# Patient Record
Sex: FEMALE | Race: OTHER | HISPANIC OR LATINO
[De-identification: names, ages, dates, MRNs, and addresses within clinical notes are randomized per-mention and may not be internally consistent; named-entity substitution may affect disease eponyms.]

---

## 2023-03-09 ENCOUNTER — TRANSCRIPTION ENCOUNTER (OUTPATIENT)
Age: 33
End: 2023-03-09

## 2023-03-10 ENCOUNTER — INPATIENT (INPATIENT)
Facility: HOSPITAL | Age: 33
LOS: 3 days | Discharge: HOME | DRG: 817 | End: 2023-03-14
Attending: OBSTETRICS & GYNECOLOGY | Admitting: OBSTETRICS & GYNECOLOGY
Payer: MEDICAID

## 2023-03-10 VITALS
WEIGHT: 115.08 LBS | OXYGEN SATURATION: 96 % | TEMPERATURE: 98 F | DIASTOLIC BLOOD PRESSURE: 70 MMHG | SYSTOLIC BLOOD PRESSURE: 115 MMHG | RESPIRATION RATE: 18 BRPM | HEART RATE: 92 BPM

## 2023-03-10 DIAGNOSIS — O00.109 UNSPECIFIED TUBAL PREGNANCY WITHOUT INTRAUTERINE PREGNANCY: ICD-10-CM

## 2023-03-10 DIAGNOSIS — Z98.89 OTHER SPECIFIED POSTPROCEDURAL STATES: Chronic | ICD-10-CM

## 2023-03-10 DIAGNOSIS — O00.90 UNSPECIFIED ECTOPIC PREGNANCY WITHOUT INTRAUTERINE PREGNANCY: ICD-10-CM

## 2023-03-10 LAB
ALBUMIN SERPL ELPH-MCNC: 3.4 G/DL — LOW (ref 3.5–5)
ALP SERPL-CCNC: 99 U/L — SIGNIFICANT CHANGE UP (ref 40–120)
ALT FLD-CCNC: 21 U/L DA — SIGNIFICANT CHANGE UP (ref 10–60)
ANION GAP SERPL CALC-SCNC: 7 MMOL/L — SIGNIFICANT CHANGE UP (ref 5–17)
APPEARANCE UR: CLEAR — SIGNIFICANT CHANGE UP
APTT BLD: 24.7 SEC — LOW (ref 27.5–35.5)
AST SERPL-CCNC: 19 U/L — SIGNIFICANT CHANGE UP (ref 10–40)
BACTERIA # UR AUTO: ABNORMAL /HPF
BASOPHILS # BLD AUTO: 0.04 K/UL — SIGNIFICANT CHANGE UP (ref 0–0.2)
BASOPHILS NFR BLD AUTO: 0.3 % — SIGNIFICANT CHANGE UP (ref 0–2)
BILIRUB SERPL-MCNC: 0.2 MG/DL — SIGNIFICANT CHANGE UP (ref 0.2–1.2)
BILIRUB UR-MCNC: NEGATIVE — SIGNIFICANT CHANGE UP
BLD GP AB SCN SERPL QL: SIGNIFICANT CHANGE UP
BUN SERPL-MCNC: 13 MG/DL — SIGNIFICANT CHANGE UP (ref 7–18)
CALCIUM SERPL-MCNC: 8.6 MG/DL — SIGNIFICANT CHANGE UP (ref 8.4–10.5)
CHLORIDE SERPL-SCNC: 105 MMOL/L — SIGNIFICANT CHANGE UP (ref 96–108)
CO2 SERPL-SCNC: 23 MMOL/L — SIGNIFICANT CHANGE UP (ref 22–31)
COLOR SPEC: YELLOW — SIGNIFICANT CHANGE UP
CREAT SERPL-MCNC: 0.93 MG/DL — SIGNIFICANT CHANGE UP (ref 0.5–1.3)
DIFF PNL FLD: ABNORMAL
EGFR: 85 ML/MIN/1.73M2 — SIGNIFICANT CHANGE UP
EOSINOPHIL # BLD AUTO: 0.42 K/UL — SIGNIFICANT CHANGE UP (ref 0–0.5)
EOSINOPHIL NFR BLD AUTO: 3.1 % — SIGNIFICANT CHANGE UP (ref 0–6)
EPI CELLS # UR: SIGNIFICANT CHANGE UP /HPF
GLUCOSE SERPL-MCNC: 108 MG/DL — HIGH (ref 70–99)
GLUCOSE UR QL: NEGATIVE — SIGNIFICANT CHANGE UP
HCG SERPL-ACNC: 765 MIU/ML — HIGH
HCT VFR BLD CALC: 33.5 % — LOW (ref 34.5–45)
HGB BLD-MCNC: 10.3 G/DL — LOW (ref 11.5–15.5)
IMM GRANULOCYTES NFR BLD AUTO: 0.3 % — SIGNIFICANT CHANGE UP (ref 0–0.9)
INR BLD: 0.98 RATIO — SIGNIFICANT CHANGE UP (ref 0.88–1.16)
KETONES UR-MCNC: NEGATIVE — SIGNIFICANT CHANGE UP
LEUKOCYTE ESTERASE UR-ACNC: NEGATIVE — SIGNIFICANT CHANGE UP
LIDOCAIN IGE QN: 109 U/L — SIGNIFICANT CHANGE UP (ref 73–393)
LYMPHOCYTES # BLD AUTO: 2.83 K/UL — SIGNIFICANT CHANGE UP (ref 1–3.3)
LYMPHOCYTES # BLD AUTO: 21 % — SIGNIFICANT CHANGE UP (ref 13–44)
MCHC RBC-ENTMCNC: 23.1 PG — LOW (ref 27–34)
MCHC RBC-ENTMCNC: 30.7 GM/DL — LOW (ref 32–36)
MCV RBC AUTO: 75.3 FL — LOW (ref 80–100)
MONOCYTES # BLD AUTO: 0.67 K/UL — SIGNIFICANT CHANGE UP (ref 0–0.9)
MONOCYTES NFR BLD AUTO: 5 % — SIGNIFICANT CHANGE UP (ref 2–14)
NEUTROPHILS # BLD AUTO: 9.46 K/UL — HIGH (ref 1.8–7.4)
NEUTROPHILS NFR BLD AUTO: 70.3 % — SIGNIFICANT CHANGE UP (ref 43–77)
NITRITE UR-MCNC: NEGATIVE — SIGNIFICANT CHANGE UP
NRBC # BLD: 0 /100 WBCS — SIGNIFICANT CHANGE UP (ref 0–0)
PH UR: 5 — SIGNIFICANT CHANGE UP (ref 5–8)
PLATELET # BLD AUTO: 359 K/UL — SIGNIFICANT CHANGE UP (ref 150–400)
POTASSIUM SERPL-MCNC: 3.4 MMOL/L — LOW (ref 3.5–5.3)
POTASSIUM SERPL-SCNC: 3.4 MMOL/L — LOW (ref 3.5–5.3)
PROT SERPL-MCNC: 8.2 G/DL — SIGNIFICANT CHANGE UP (ref 6–8.3)
PROT UR-MCNC: NEGATIVE — SIGNIFICANT CHANGE UP
PROTHROM AB SERPL-ACNC: 11.6 SEC — SIGNIFICANT CHANGE UP (ref 10.5–13.4)
RBC # BLD: 4.45 M/UL — SIGNIFICANT CHANGE UP (ref 3.8–5.2)
RBC # FLD: 17.4 % — HIGH (ref 10.3–14.5)
RBC CASTS # UR COMP ASSIST: ABNORMAL /HPF (ref 0–2)
SARS-COV-2 RNA SPEC QL NAA+PROBE: SIGNIFICANT CHANGE UP
SODIUM SERPL-SCNC: 135 MMOL/L — SIGNIFICANT CHANGE UP (ref 135–145)
SP GR SPEC: 1.02 — SIGNIFICANT CHANGE UP (ref 1.01–1.02)
UROBILINOGEN FLD QL: NEGATIVE — SIGNIFICANT CHANGE UP
WBC # BLD: 13.46 K/UL — HIGH (ref 3.8–10.5)
WBC # FLD AUTO: 13.46 K/UL — HIGH (ref 3.8–10.5)
WBC UR QL: ABNORMAL /HPF (ref 0–5)

## 2023-03-10 PROCEDURE — 76830 TRANSVAGINAL US NON-OB: CPT | Mod: 26

## 2023-03-10 PROCEDURE — 76856 US EXAM PELVIC COMPLETE: CPT | Mod: 26

## 2023-03-10 PROCEDURE — 99285 EMERGENCY DEPT VISIT HI MDM: CPT

## 2023-03-10 PROCEDURE — 88300 SURGICAL PATH GROSS: CPT | Mod: 26,59

## 2023-03-10 PROCEDURE — 88305 TISSUE EXAM BY PATHOLOGIST: CPT | Mod: 26

## 2023-03-10 RX ORDER — SODIUM CHLORIDE 9 MG/ML
1000 INJECTION, SOLUTION INTRAVENOUS
Refills: 0 | Status: DISCONTINUED | OUTPATIENT
Start: 2023-03-10 | End: 2023-03-11

## 2023-03-10 RX ORDER — ONDANSETRON 8 MG/1
4 TABLET, FILM COATED ORAL ONCE
Refills: 0 | Status: COMPLETED | OUTPATIENT
Start: 2023-03-10 | End: 2023-03-10

## 2023-03-10 RX ORDER — MORPHINE SULFATE 50 MG/1
4 CAPSULE, EXTENDED RELEASE ORAL ONCE
Refills: 0 | Status: DISCONTINUED | OUTPATIENT
Start: 2023-03-10 | End: 2023-03-10

## 2023-03-10 RX ADMIN — SODIUM CHLORIDE 125 MILLILITER(S): 9 INJECTION, SOLUTION INTRAVENOUS at 22:44

## 2023-03-10 RX ADMIN — MORPHINE SULFATE 4 MILLIGRAM(S): 50 CAPSULE, EXTENDED RELEASE ORAL at 19:55

## 2023-03-10 RX ADMIN — ONDANSETRON 4 MILLIGRAM(S): 8 TABLET, FILM COATED ORAL at 19:56

## 2023-03-10 RX ADMIN — MORPHINE SULFATE 4 MILLIGRAM(S): 50 CAPSULE, EXTENDED RELEASE ORAL at 20:25

## 2023-03-10 NOTE — PRE-OP CHECKLIST - MUPIRONCIN COMMENTS
Patient is A&Ox4 Maori speaking admitted to OBGYN for ectopic pregnancy surgical procedure. Patient reports last drink of 4oz water at 8pm & last meal at 3pm (soup). Patient has copper IUD in place & denies any significant PMH or allergies. IV fluids running as ordered IV intact, no redness or swelling noted. Patient provided chlorhex-wipes & family has property. Covid - & labs sent as ordered. Endorsed to Kaley VÁSQUEZ in OR.

## 2023-03-10 NOTE — H&P ADULT - PROBLEM SELECTOR PLAN 1
5/30/2022  ILeighann DO, saw and evaluated the patient  I have discussed the patient with the resident/non-physician practitioner and agree with the resident's/non-physician practitioner's findings, Plan of Care, and MDM as documented in the resident's/non-physician practitioner's note, except where noted  All available labs and Radiology studies were reviewed  I was present for key portions of any procedure(s) performed by the resident/non-physician practitioner and I was immediately available to provide assistance  At this point I agree with the current assessment done in the Emergency Department  I have conducted an independent evaluation of this patient a history and physical is as follows:    42 yo female recently diagnosed with nonviable pregnancy and miscarriage presents for worsening bleeding  Has not followed up with the OBGYN since her discharge  Worsening cramping and bleeding today  Feels lightheaded feels tachycardic  She has no other complaints no chest pain  On exam the patient is tachycardic to 120 she has normal blood pressure plan is check CBC for anemia  She is not actively hemorrhaging right now vaginally  Recheck a beta hCG    She does have follow-up with OBGYN in two days    ED Course         Critical Care Time  Procedures a/p: 31 yo F, left ruptured ectopic pregnancy  - patient is consented at bedside for diagnostic lap, possible open salpingectomy  - pre operative labwork sent  - cont close monitoring    Case d/w Dr Cisneros

## 2023-03-10 NOTE — ED PROVIDER NOTE - PHYSICAL EXAMINATION
General: pt lying in stretcher, appears stated age and is not in distress  HEENT: AT/NC, pink conjunctiva, anicteric sclerae, EOMI, mmm   Neck: supple, full ROM, trachea midline, no JVD, no cervical LAD, no midline ttp or stepoffs  Lungs: symmetric excursion, b/l clear vesicular breath sounds with no wheezes, crackles, or rhonchi  Heart: rrr, S1, S2 normal; no S3 or S4; no murmurs or rubs  Abd: normal bowel sounds; soft, + tender across the entire low abd; no rebound, no guarding, spleen non-palpable; no hepatomegaly, no masses  Back: no midline spinal tenderness or stepoffs, no costovertebral angle tenderness  Extremities: no clubbing, cyanosis, or edema; no palpable deformities or fractures  Skin: good turgor; no rashes, petechiae, ecchymoses, or jaundice  Pulses: radial, posterior tibialis (PT), dorsalis pedis (DP) all 2+ & symmetric  Neuro: awake, alert, responsive; oriented to person, place and time; cranial nerves grossly intact, EOMI, intact jaw movement, intact facial sensation, no facial asymmetry, hearing intact; no nystagmus, tongue midline; Motor: Normal tone in upper and lower extremities bilaterally; Sensory: intact; normal steady gait;

## 2023-03-10 NOTE — ED ADULT NURSE NOTE - ED STAT RN HANDOFF DETAILS
Patient is A&Ox4 Greek speaking admitted to OBGYN for ectopic pregnancy surgical procedure. Patient reports last drink of 4oz water at 8pm & last meal at 3pm (soup). Patient has copper IUD in place & denies any significant PMH or allergies. IV fluids running as ordered IV intact, no redness or swelling noted. Patient provided chlorhex-wipes & family has property. Covid - & labs sent as ordered. Endorsed to Kaley VÁSQUEZ in OR.

## 2023-03-10 NOTE — ED PROVIDER NOTE - OBJECTIVE STATEMENT
33 yo f  LMP 23, referred to the ED for possible ectopic pregnancy. Pt reports low abd pain x 3 days progressively worsening and she was concerned about appendicitis so she went to [facility]. There she was found to have a positive HCG in the setting of IUD in place. Pt was immediately referred to the ED for further management. She reports pain across the entire low abd, nausea. Denies any f/c/ns, vaginal bleeding, diarrhea or other complaints.

## 2023-03-10 NOTE — H&P ADULT - ASSESSMENT
a/p: 33 yo F, left ruptured ectopic pregnancy  - patient is consented at bedside for diagnostic lap, possible open salpingectomy  - pre operative labwork sent  - cont close monitoring    Case d/w Dr Cisneros

## 2023-03-10 NOTE — ED ADULT NURSE NOTE - OBJECTIVE STATEMENT
[id number] [facility] Wound Ostomy Continence Nurse  Follow-up Progress Note       [facility]  MEDICAL RECORD NUMBER:  [id number]  AGE:  68 y.o.    GENDER:  female  :  1942  TODAY'S DATE:  2022    Subjective:   F/U for unstageable wound to sacrum    Objective:    BP (!) 155/85   Pulse 81   Temp 98.1 °F (36.7 °C) (Oral)   Resp 17   Ht 5' 2\" (1.575 m)   Wt 168 lb 8 oz (76.4 kg)   SpO2 94%   BMI 30.82 kg/m²   Sami Risk Score: Sami Scale Score: 11  Assessment:   Measurements:  Wound 22 Heel Left (Active)   Wound Image   22 1322   Wound Etiology Deep tissue/Injury 22 0920   Dressing Status Other (Comment) 22 0916   Wound Cleansed Other (Comment) 22 0916   Dressing/Treatment Barrier film 22 1322   Wound Length (cm) 0.5 cm 22 1322   Wound Width (cm) 0.5 cm 22 1322   Wound Depth (cm) 0 cm 22 1126   Wound Surface Area (cm^2) 0.25 cm^2 22 1322   Change in Wound Size % (l*w) 0 22 1322   Wound Volume (cm^3) 0 cm^3 22 1126   Wound Assessment Purple/maroon 22 0920   Drainage Amount None 22 0920   Odor None 22 0920   Nallely-wound Assessment Intact 22 0920   Margins Attached edges 22 0920   Number of days: 17       Wound 22 Heel Right (Active)   Wound Image   22 1322   Wound Etiology Deep tissue/Injury 22 0920   Dressing Status Other (Comment) 22 0916   Wound Cleansed Other (Comment) 22 0916   Dressing/Treatment Barrier film 22 0916   Wound Length (cm) 1.5 cm 22 1322   Wound Width (cm) 1.5 cm 22 1322   Wound Depth (cm) 0 cm 22 1126   Wound Surface Area (cm^2) 2.25 cm^2 22 1322   Change in Wound Size % (l*w) -125 22 1322   Wound Volume (cm^3) 0 cm^3 22 1126   Wound Assessment Purple/maroon 22 0920   Drainage Amount None 22 0920   Odor None 22 0920   Nallely-wound Assessment Intact 22 0920 Margins Attached edges 08/16/22 0920   Number of days: 17       Wound 07/30/22 Sacrum Mid (Active)   Wound Image   08/16/22 1329   Wound Etiology Pressure Unstageable 08/16/22 1329   Dressing Status New dressing applied 08/16/22 1329   Wound Cleansed Other (Comment) 08/04/22 0916   Dressing/Treatment Silicone border 85/92/06 1329   Wound Length (cm) 7 cm 08/16/22 1329   Wound Width (cm) 9 cm 08/16/22 1329   Wound Depth (cm) 3 cm 08/16/22 1329   Wound Surface Area (cm^2) 63 cm^2 08/16/22 1329   Change in Wound Size % (l*w) -20 08/16/22 1329   Wound Volume (cm^3) 189 cm^3 08/16/22 1329   Wound Assessment Slough 08/16/22 1329   Drainage Amount Small 08/16/22 1329   Drainage Description Serosanguinous;Purulent 08/16/22 1329   Odor Mild 08/16/22 1329   Nallely-wound Assessment Denuded;Non-blanchable erythema;Fragile 08/16/22 1329   Margins Unattached edges 08/04/22 0916   Wound Thickness Description not for Pressure Injury Full thickness 08/16/22 1329   Number of days: 17           Pt awake and alert. Pt had small BM when turning. Pt wound now open with palpable bone. Attempted santyl, however may recommend debridement due to 3cm depth under slough  Periwound skin red and denuded. Abd folds red and denuded. Interdry placed. Plan:   Plan of Care:   Sacral wound: general surgery consult; MD notified  Continue wet to dry until pt can be evaluated. Specialty Bed Required : Yes   [x] Low Air Loss   [] Pressure Redistribution  [] Fluid Immersion  [] Bariatric  [] Total Pressure Relief  [] Other:     Current Diet: ADULT DIET;  Regular  ADULT ORAL NUTRITION SUPPLEMENT; Lunch, Dinner, Breakfast; Standard High Calorie/High Protein Oral Supplement  Dietician consult:  Yes    Discharge Plan:  Placement for patient upon discharge: skilled nursing   Patient appropriate for Outpatient [address]: No    Referrals:  []   [] [facility]  [] Supplies  [] Other    Patient/Caregiver Teaching:  Level of patient/caregiver understanding able to:   [] Indicates understanding       [] Needs reinforcement  [] Unsuccessful      [x] Verbal Understanding  [] Demonstrated understanding       [] No evidence of learning  [] Refused teaching         [] N/A       Electronically signed by Anoop Pereira RN, CWOCN on 8/16/2022 at 1:35 PM Patient c/o worsening RLQ pain x3 days. Patient seen at urgent care today & sent for further evaluation to r/o ectopic pregnancy as she  tested for positive pregnancy with copper IUD in place & vaginal bleeding similar to menstrual cycle. Patient reports LMP 1/28/2023.

## 2023-03-10 NOTE — H&P ADULT - HISTORY OF PRESENT ILLNESS
33 yo F presenting to ED with sharp 10 out of 10 abdominal pain beginning Tuesday with nausea beginning today. Patient's LMP 2023. Hcg positive, 7 wks.   Patient has IUD in situ. Patient denies vaginal bleeding, denies vomiting, diarrhea, SOB or chest pain.    ob/gynhx: , primary c/s in 2016, for LGA, boy. No history of STD or abnl pap smears. last seen gynecologist 2 years prior for IUD placement. Normal menses.       33 yo F presenting [facility] ED [facility] for recurring sharp 10 out of 10 abdominal pain beginning Tuesday with accompanying nausea beginning today. Patient's LMP 2023. Hcg positive, US findings concerning for left ectopic adnexal pregnancy.Patient has IUD in situ. Patient denies vaginal bleeding, denies vomiting, diarrhea, SOB or chest pain. Patient denies fevers or chills. Reports one sexual partner.    ob/gynhx: , primary c/s in 2016, for LGA, boy. Denies history of STDs or abnl pap smears. last seen gynecologist  2 years prior for IUD placement. Normal menses.  shx: c/s 2016, uncomplicated  medhx: denies

## 2023-03-10 NOTE — ED PROVIDER NOTE - CLINICAL SUMMARY MEDICAL DECISION MAKING FREE TEXT BOX
Pt w/ aforementioned presentation concerning for but not limited to ectopic pregnancy, other acute abd  Will get labs, imaging, treat symptoms, monitor and reassess.

## 2023-03-10 NOTE — H&P ADULT - NSHPPHYSICALEXAM_GEN_ALL_CORE
T(C): 36.7 (10 Mar 2023 18:07), Max: 36.7 (10 Mar 2023 18:07)  T(F): 98.1 (10 Mar 2023 18:07), Max: 98.1 (10 Mar 2023 18:07)  HR: 92 (10 Mar 2023 18:07) (92 - 92)  BP: 115/70 (10 Mar 2023 18:07) (115/70 - 115/70)  RR: 18 (10 Mar 2023 18:07) (18 - 18)  SpO2: 96% (10 Mar 2023 18:07) (96% - 96%)    gen: aaox3, NAD  abd: soft, +tenderness on palpation, +guarding, no rebound  pelvis: no vaginal bleeding  ext: nonedematous, nontender T(C): 36.7 (10 Mar 2023 18:07), Max: 36.7 (10 Mar 2023 18:07)  T(F): 98.1 (10 Mar 2023 18:07), Max: 98.1 (10 Mar 2023 18:07)  HR: 92 (10 Mar 2023 18:07) (92 - 92)  BP: 115/70 (10 Mar 2023 18:07) (115/70 - 115/70)  RR: 18 (10 Mar 2023 18:07) (18 - 18)  SpO2: 96% (10 Mar 2023 18:07) (96% - 96%)    gen: aaox3, NAD  abd: soft, +tenderness on palpation, +guarding, no rebound  pelvis: no active vaginal bleeding  ext: nonedematous, nontender b/l

## 2023-03-10 NOTE — H&P ADULT - NS ATTEND AMEND GEN_ALL_CORE FT
31 yo  at  approximately 7 weeks gestation who presents to ER with signs and symptoms suggesting a ruptured ectopic pregnancy. Findings were supported with sonographic evidence of ruptured L tubal pregnancy .Pt was consented for IUD removal ,D&C and laparoscopic exploration (and possible laparotomy)

## 2023-03-10 NOTE — H&P ADULT - NSHPLABSRESULTS_GEN_ALL_CORE
HCG Quantitative, Serum: 765                          10.3   13.46 )-----------( 359      ( 10 Mar 2023 20:27 )             33.5       < from: US Transvaginal (03.10.23 @ 20:54) >    CC: [id number] EXAM:  US TRANSVAGINAL   ORDERED BY: ROJAS FARMER     ACC: [id number] EXAM:  US PELVIC COMPLETE   ORDERED BY: ROJAS FARMER     PROCEDURE DATE:  03/10/2023          INTERPRETATION:  CLINICAL INDICATION: Lower abdominal pain, nausea. 6   weeks 1 day by LMP 1/26/2023. Beta-hCG 765.    TECHNIQUE: Transabdominal and transvaginal ultrasound of the pelvis was   performed. Grayscale, color Doppler and spectral Doppler were utilized.    COMPARISON: CT and US 10/12/2016..    FINDINGS:    Uterus: 8.4 x 4.8 x 6.2 cm. Unremarkable.  Endometrium: 1.0 cm. IUD in place. No intrauterine gestation identified.  Right ovary: 2.5 x 0.9 x 2.0 cm. Small follicles. Flow present.  Left ovary: 2.4 x 1.9 x 2.5 cm. A 1.3 x 1.6 cm corpus luteum. Flow   present. A 2.9 x 1.9 x 2.5 cm hypoechoic structure with somewhat tubular   configuration and peripheral vascularity at the left adnexa.  Additional: Trace free fluid with minimal debris..    IMPRESSION:    IUD in place. Findings concerning for left ectopic adnexal pregnancy.   Trace free fluid with minimal debris. Recommend clinical correlation to   assess early rupture.    Discussed with Dr. Farmer.    --- End of Report ---            CHELSEA SHORT MD; Attending Radiologist  This document has been electronically signed. Mar 10 2023  9:06PM    < end of copied text >

## 2023-03-11 ENCOUNTER — TRANSCRIPTION ENCOUNTER (OUTPATIENT)
Age: 33
End: 2023-03-11

## 2023-03-11 LAB
ANION GAP SERPL CALC-SCNC: 8 MMOL/L — SIGNIFICANT CHANGE UP (ref 5–17)
BASOPHILS # BLD AUTO: 0.02 K/UL — SIGNIFICANT CHANGE UP (ref 0–0.2)
BASOPHILS NFR BLD AUTO: 0.1 % — SIGNIFICANT CHANGE UP (ref 0–2)
BUN SERPL-MCNC: 10 MG/DL — SIGNIFICANT CHANGE UP (ref 7–18)
CALCIUM SERPL-MCNC: 7.5 MG/DL — LOW (ref 8.4–10.5)
CHLORIDE SERPL-SCNC: 109 MMOL/L — HIGH (ref 96–108)
CO2 SERPL-SCNC: 20 MMOL/L — LOW (ref 22–31)
CREAT SERPL-MCNC: 0.6 MG/DL — SIGNIFICANT CHANGE UP (ref 0.5–1.3)
EGFR: 124 ML/MIN/1.73M2 — SIGNIFICANT CHANGE UP
EOSINOPHIL # BLD AUTO: 0 K/UL — SIGNIFICANT CHANGE UP (ref 0–0.5)
EOSINOPHIL NFR BLD AUTO: 0 % — SIGNIFICANT CHANGE UP (ref 0–6)
GLUCOSE SERPL-MCNC: 139 MG/DL — HIGH (ref 70–99)
HCT VFR BLD CALC: 28.1 % — LOW (ref 34.5–45)
HGB BLD-MCNC: 8.8 G/DL — LOW (ref 11.5–15.5)
IMM GRANULOCYTES NFR BLD AUTO: 0.7 % — SIGNIFICANT CHANGE UP (ref 0–0.9)
LYMPHOCYTES # BLD AUTO: 1.03 K/UL — SIGNIFICANT CHANGE UP (ref 1–3.3)
LYMPHOCYTES # BLD AUTO: 6.5 % — LOW (ref 13–44)
MCHC RBC-ENTMCNC: 23.6 PG — LOW (ref 27–34)
MCHC RBC-ENTMCNC: 31.3 GM/DL — LOW (ref 32–36)
MCV RBC AUTO: 75.3 FL — LOW (ref 80–100)
MONOCYTES # BLD AUTO: 0.28 K/UL — SIGNIFICANT CHANGE UP (ref 0–0.9)
MONOCYTES NFR BLD AUTO: 1.8 % — LOW (ref 2–14)
NEUTROPHILS # BLD AUTO: 14.4 K/UL — HIGH (ref 1.8–7.4)
NEUTROPHILS NFR BLD AUTO: 90.9 % — HIGH (ref 43–77)
NRBC # BLD: 0 /100 WBCS — SIGNIFICANT CHANGE UP (ref 0–0)
PLATELET # BLD AUTO: 272 K/UL — SIGNIFICANT CHANGE UP (ref 150–400)
POTASSIUM SERPL-MCNC: 3.9 MMOL/L — SIGNIFICANT CHANGE UP (ref 3.5–5.3)
POTASSIUM SERPL-SCNC: 3.9 MMOL/L — SIGNIFICANT CHANGE UP (ref 3.5–5.3)
RBC # BLD: 3.73 M/UL — LOW (ref 3.8–5.2)
RBC # FLD: 17.3 % — HIGH (ref 10.3–14.5)
SODIUM SERPL-SCNC: 137 MMOL/L — SIGNIFICANT CHANGE UP (ref 135–145)
WBC # BLD: 15.84 K/UL — HIGH (ref 3.8–10.5)
WBC # FLD AUTO: 15.84 K/UL — HIGH (ref 3.8–10.5)

## 2023-03-11 RX ORDER — PANTOPRAZOLE SODIUM 20 MG/1
40 TABLET, DELAYED RELEASE ORAL
Refills: 0 | Status: DISCONTINUED | OUTPATIENT
Start: 2023-03-11 | End: 2023-03-12

## 2023-03-11 RX ORDER — KETOROLAC TROMETHAMINE 30 MG/ML
30 SYRINGE (ML) INJECTION EVERY 6 HOURS
Refills: 0 | Status: DISCONTINUED | OUTPATIENT
Start: 2023-03-11 | End: 2023-03-11

## 2023-03-11 RX ORDER — ONDANSETRON 8 MG/1
4 TABLET, FILM COATED ORAL EVERY 4 HOURS
Refills: 0 | Status: DISCONTINUED | OUTPATIENT
Start: 2023-03-11 | End: 2023-03-14

## 2023-03-11 RX ORDER — METOCLOPRAMIDE HCL 10 MG
10 TABLET ORAL EVERY 6 HOURS
Refills: 0 | Status: DISCONTINUED | OUTPATIENT
Start: 2023-03-11 | End: 2023-03-12

## 2023-03-11 RX ORDER — ACETAMINOPHEN 500 MG
975 TABLET ORAL EVERY 6 HOURS
Refills: 0 | Status: DISCONTINUED | OUTPATIENT
Start: 2023-03-11 | End: 2023-03-12

## 2023-03-11 RX ORDER — MAGNESIUM HYDROXIDE 400 MG/1
30 TABLET, CHEWABLE ORAL
Refills: 0 | Status: DISCONTINUED | OUTPATIENT
Start: 2023-03-11 | End: 2023-03-12

## 2023-03-11 RX ORDER — SIMETHICONE 80 MG/1
80 TABLET, CHEWABLE ORAL EVERY 6 HOURS
Refills: 0 | Status: DISCONTINUED | OUTPATIENT
Start: 2023-03-11 | End: 2023-03-12

## 2023-03-11 RX ORDER — OXYCODONE AND ACETAMINOPHEN 5; 325 MG/1; MG/1
2 TABLET ORAL ONCE
Refills: 0 | Status: DISCONTINUED | OUTPATIENT
Start: 2023-03-11 | End: 2023-03-12

## 2023-03-11 RX ORDER — MORPHINE SULFATE 50 MG/1
2 CAPSULE, EXTENDED RELEASE ORAL EVERY 4 HOURS
Refills: 0 | Status: DISCONTINUED | OUTPATIENT
Start: 2023-03-11 | End: 2023-03-11

## 2023-03-11 RX ORDER — IBUPROFEN 200 MG
600 TABLET ORAL EVERY 6 HOURS
Refills: 0 | Status: DISCONTINUED | OUTPATIENT
Start: 2023-03-11 | End: 2023-03-12

## 2023-03-11 RX ORDER — HYDROMORPHONE HYDROCHLORIDE 2 MG/ML
0.5 INJECTION INTRAMUSCULAR; INTRAVENOUS; SUBCUTANEOUS
Refills: 0 | Status: DISCONTINUED | OUTPATIENT
Start: 2023-03-11 | End: 2023-03-11

## 2023-03-11 RX ORDER — SODIUM CHLORIDE 9 MG/ML
1000 INJECTION, SOLUTION INTRAVENOUS
Refills: 0 | Status: DISCONTINUED | OUTPATIENT
Start: 2023-03-11 | End: 2023-03-11

## 2023-03-11 RX ORDER — BENZOCAINE AND MENTHOL 5; 1 G/100ML; G/100ML
1 LIQUID ORAL EVERY 4 HOURS
Refills: 0 | Status: DISCONTINUED | OUTPATIENT
Start: 2023-03-11 | End: 2023-03-12

## 2023-03-11 RX ORDER — SODIUM CHLORIDE 9 MG/ML
1000 INJECTION, SOLUTION INTRAVENOUS
Refills: 0 | Status: DISCONTINUED | OUTPATIENT
Start: 2023-03-11 | End: 2023-03-12

## 2023-03-11 RX ORDER — ONDANSETRON 8 MG/1
4 TABLET, FILM COATED ORAL ONCE
Refills: 0 | Status: DISCONTINUED | OUTPATIENT
Start: 2023-03-11 | End: 2023-03-11

## 2023-03-11 RX ADMIN — HYDROMORPHONE HYDROCHLORIDE 0.5 MILLIGRAM(S): 2 INJECTION INTRAMUSCULAR; INTRAVENOUS; SUBCUTANEOUS at 01:48

## 2023-03-11 RX ADMIN — SIMETHICONE 80 MILLIGRAM(S): 80 TABLET, CHEWABLE ORAL at 17:24

## 2023-03-11 RX ADMIN — SODIUM CHLORIDE 125 MILLILITER(S): 9 INJECTION, SOLUTION INTRAVENOUS at 17:49

## 2023-03-11 RX ADMIN — MORPHINE SULFATE 2 MILLIGRAM(S): 50 CAPSULE, EXTENDED RELEASE ORAL at 19:42

## 2023-03-11 RX ADMIN — BENZOCAINE AND MENTHOL 1 LOZENGE: 5; 1 LIQUID ORAL at 17:24

## 2023-03-11 RX ADMIN — Medication 975 MILLIGRAM(S): at 19:45

## 2023-03-11 RX ADMIN — Medication 600 MILLIGRAM(S): at 23:16

## 2023-03-11 RX ADMIN — MAGNESIUM HYDROXIDE 30 MILLILITER(S): 400 TABLET, CHEWABLE ORAL at 18:10

## 2023-03-11 RX ADMIN — Medication 30 MILLIGRAM(S): at 06:15

## 2023-03-11 RX ADMIN — PANTOPRAZOLE SODIUM 40 MILLIGRAM(S): 20 TABLET, DELAYED RELEASE ORAL at 05:47

## 2023-03-11 RX ADMIN — Medication 975 MILLIGRAM(S): at 13:06

## 2023-03-11 RX ADMIN — SIMETHICONE 80 MILLIGRAM(S): 80 TABLET, CHEWABLE ORAL at 23:16

## 2023-03-11 RX ADMIN — SODIUM CHLORIDE 125 MILLILITER(S): 9 INJECTION, SOLUTION INTRAVENOUS at 06:59

## 2023-03-11 RX ADMIN — MORPHINE SULFATE 2 MILLIGRAM(S): 50 CAPSULE, EXTENDED RELEASE ORAL at 20:00

## 2023-03-11 RX ADMIN — SIMETHICONE 80 MILLIGRAM(S): 80 TABLET, CHEWABLE ORAL at 12:06

## 2023-03-11 RX ADMIN — Medication 30 MILLIGRAM(S): at 12:02

## 2023-03-11 RX ADMIN — HYDROMORPHONE HYDROCHLORIDE 0.5 MILLIGRAM(S): 2 INJECTION INTRAMUSCULAR; INTRAVENOUS; SUBCUTANEOUS at 01:30

## 2023-03-11 RX ADMIN — Medication 30 MILLIGRAM(S): at 19:45

## 2023-03-11 RX ADMIN — HYDROMORPHONE HYDROCHLORIDE 0.5 MILLIGRAM(S): 2 INJECTION INTRAMUSCULAR; INTRAVENOUS; SUBCUTANEOUS at 01:15

## 2023-03-11 RX ADMIN — Medication 30 MILLIGRAM(S): at 12:17

## 2023-03-11 RX ADMIN — Medication 30 MILLIGRAM(S): at 05:47

## 2023-03-11 RX ADMIN — Medication 975 MILLIGRAM(S): at 12:06

## 2023-03-11 RX ADMIN — HYDROMORPHONE HYDROCHLORIDE 0.5 MILLIGRAM(S): 2 INJECTION INTRAMUSCULAR; INTRAVENOUS; SUBCUTANEOUS at 01:32

## 2023-03-11 RX ADMIN — Medication 975 MILLIGRAM(S): at 17:24

## 2023-03-11 RX ADMIN — Medication 30 MILLIGRAM(S): at 18:10

## 2023-03-11 NOTE — CHART NOTE - NSCHARTNOTEFT_GEN_A_CORE
Dr Valentine accompanied to bedside for evaluation of abdominal pain. Pt c/o intractable pain.    BP 89/47 HR 82 Temp 37 deg C  Abdominal PE: +bs, soft, tenderness that is disproportionate to palpation of the incision site, nondistended, dressing removed steri strips in place  Arnold bag was repositioned and immediately began filling with clear yellow urine, 650 cc now in bag    A/P 29 y/o POD #0 s/p mini laparotomy, left salpingectomy and IUD removal for left ectopic pregnancy. Intractable pain disproportionate to physical exam and routine postoperative goals. hypotensive, afebrile, adequate UO  - percocet for pain mgmt  - close monitoring  - continue fluid replacement  - arnold in place, monitor I/Os  d/w Dr Valentine [place] attending

## 2023-03-11 NOTE — PROGRESS NOTE ADULT - SUBJECTIVE AND OBJECTIVE BOX
Patient seen at bedside, c/o 5/10 pain. Due to ambulate, arnold to be removed and due to void, tolerating clear diet at this time. Arnold draining clear yellow liquid.  Denies HA, CP, SOB, N/V/D, dizziness, weakness, palpitations, worsening abdominal pain, worsening vaginal bleeding.    Vital Signs Last 24 Hrs  T(C): 36.8 (11 Mar 2023 06:10), Max: 37.1 (11 Mar 2023 02:16)  T(F): 98.3 (11 Mar 2023 06:10), Max: 98.8 (11 Mar 2023 02:16)  HR: 82 (11 Mar 2023 06:10) (74 - 109)  BP: 98/53 (11 Mar 2023 06:10) (80/52 - 133/56)  BP(mean): 68 (11 Mar 2023 01:47) (59 - 80)  RR: 17 (11 Mar 2023 06:10) (10 - 19)  SpO2: 98% (11 Mar 2023 06:10) (95% - 100%)    Parameters below as of 11 Mar 2023 06:10  Patient On (Oxygen Delivery Method): room air        Gen: A&O x 3, NAD  Chest: CTA B/L  Cardiac: S1, S2  RRR  Abdomen: +BS; soft; nontender, nondistended; Dressing C/D/I  Gyn: Minimal lochia  Ext: Nontender,  no worsening edema, venodynes intact, no calf tenderness                          8.8    15.84 )-----------( 272      ( 11 Mar 2023 06:21 )             28.1       A/P: 29 y/o POD #0 s/p mini laparotomy, left salpingectomy, and IUD removal for left ectopic pregnancy. Pt stable    -Pain management as needed  -DVT ppx: OOB and ambulate upon arnold removal  -f/u Rpt CBC   -DC arnold f/u trial of void  -Advance diet with flatus  -Encourage breastfeeding   -d/w Dr. Cisneros

## 2023-03-11 NOTE — CHART NOTE - NSCHARTNOTEFT_GEN_A_CORE
Pt reevaluated at bedside, is resting, states pain is now well controlled. S/p toradol and tylenol  UO 50 cc/hr  Plan to d/c [facility] later in afternoon  d/w house attending Dr Jung

## 2023-03-11 NOTE — PATIENT PROFILE ADULT - FALL HARM RISK - HARM RISK INTERVENTIONS

## 2023-03-12 DIAGNOSIS — D62 ACUTE POSTHEMORRHAGIC ANEMIA: ICD-10-CM

## 2023-03-12 LAB
ALBUMIN SERPL ELPH-MCNC: 1.9 G/DL — LOW (ref 3.5–5)
ALP SERPL-CCNC: 58 U/L — SIGNIFICANT CHANGE UP (ref 40–120)
ALT FLD-CCNC: 14 U/L DA — SIGNIFICANT CHANGE UP (ref 10–60)
ANION GAP SERPL CALC-SCNC: 4 MMOL/L — LOW (ref 5–17)
ANION GAP SERPL CALC-SCNC: 6 MMOL/L — SIGNIFICANT CHANGE UP (ref 5–17)
ANION GAP SERPL CALC-SCNC: 9 MMOL/L — SIGNIFICANT CHANGE UP (ref 5–17)
ANISOCYTOSIS BLD QL: SLIGHT — SIGNIFICANT CHANGE UP
APTT BLD: 23 SEC — LOW (ref 27.5–35.5)
AST SERPL-CCNC: 18 U/L — SIGNIFICANT CHANGE UP (ref 10–40)
BASE EXCESS BLDV CALC-SCNC: -7.4 MMOL/L — SIGNIFICANT CHANGE UP
BASOPHILS # BLD AUTO: 0.02 K/UL — SIGNIFICANT CHANGE UP (ref 0–0.2)
BASOPHILS # BLD AUTO: 0.03 K/UL — SIGNIFICANT CHANGE UP (ref 0–0.2)
BASOPHILS NFR BLD AUTO: 0.2 % — SIGNIFICANT CHANGE UP (ref 0–2)
BASOPHILS NFR BLD AUTO: 0.2 % — SIGNIFICANT CHANGE UP (ref 0–2)
BILIRUB SERPL-MCNC: 0.3 MG/DL — SIGNIFICANT CHANGE UP (ref 0.2–1.2)
BLD GP AB SCN SERPL QL: SIGNIFICANT CHANGE UP
BUN SERPL-MCNC: 6 MG/DL — LOW (ref 7–18)
BUN SERPL-MCNC: 6 MG/DL — LOW (ref 7–18)
BUN SERPL-MCNC: 7 MG/DL — SIGNIFICANT CHANGE UP (ref 7–18)
CA-I SERPL-SCNC: SIGNIFICANT CHANGE UP MMOL/L (ref 1.15–1.33)
CALCIUM SERPL-MCNC: 6.4 MG/DL — CRITICAL LOW (ref 8.4–10.5)
CALCIUM SERPL-MCNC: 7.4 MG/DL — LOW (ref 8.4–10.5)
CALCIUM SERPL-MCNC: 7.5 MG/DL — LOW (ref 8.4–10.5)
CHLORIDE SERPL-SCNC: 110 MMOL/L — HIGH (ref 96–108)
CHLORIDE SERPL-SCNC: 111 MMOL/L — HIGH (ref 96–108)
CHLORIDE SERPL-SCNC: 118 MMOL/L — HIGH (ref 96–108)
CO2 SERPL-SCNC: 19 MMOL/L — LOW (ref 22–31)
CO2 SERPL-SCNC: 20 MMOL/L — LOW (ref 22–31)
CO2 SERPL-SCNC: 24 MMOL/L — SIGNIFICANT CHANGE UP (ref 22–31)
CREAT SERPL-MCNC: 0.36 MG/DL — LOW (ref 0.5–1.3)
CREAT SERPL-MCNC: 0.37 MG/DL — LOW (ref 0.5–1.3)
CREAT SERPL-MCNC: 0.54 MG/DL — SIGNIFICANT CHANGE UP (ref 0.5–1.3)
D DIMER BLD IA.RAPID-MCNC: 1215 NG/ML DDU — HIGH
DIR ANTIGLOB POLYSPECIFIC INTERPRETATION: SIGNIFICANT CHANGE UP
DIR ANTIGLOB POLYSPECIFIC INTERPRETATION: SIGNIFICANT CHANGE UP
EGFR: 127 ML/MIN/1.73M2 — SIGNIFICANT CHANGE UP
EGFR: 139 ML/MIN/1.73M2 — SIGNIFICANT CHANGE UP
EGFR: 140 ML/MIN/1.73M2 — SIGNIFICANT CHANGE UP
EOSINOPHIL # BLD AUTO: 0.12 K/UL — SIGNIFICANT CHANGE UP (ref 0–0.5)
EOSINOPHIL # BLD AUTO: 0.22 K/UL — SIGNIFICANT CHANGE UP (ref 0–0.5)
EOSINOPHIL NFR BLD AUTO: 1.2 % — SIGNIFICANT CHANGE UP (ref 0–6)
EOSINOPHIL NFR BLD AUTO: 1.8 % — SIGNIFICANT CHANGE UP (ref 0–6)
GAS PNL BLDV: 142 MMOL/L — SIGNIFICANT CHANGE UP (ref 136–145)
GAS PNL BLDV: SIGNIFICANT CHANGE UP
GLUCOSE SERPL-MCNC: 130 MG/DL — HIGH (ref 70–99)
GLUCOSE SERPL-MCNC: 71 MG/DL — SIGNIFICANT CHANGE UP (ref 70–99)
GLUCOSE SERPL-MCNC: 76 MG/DL — SIGNIFICANT CHANGE UP (ref 70–99)
HCO3 BLDV-SCNC: 17 MMOL/L — LOW (ref 22–29)
HCT VFR BLD CALC: 19.2 % — CRITICAL LOW (ref 34.5–45)
HCT VFR BLD CALC: 23.4 % — LOW (ref 34.5–45)
HCT VFR BLD CALC: 28.8 % — LOW (ref 34.5–45)
HCT VFR BLD CALC: 32.6 % — LOW (ref 34.5–45)
HGB BLD-MCNC: 10.6 G/DL — LOW (ref 11.5–15.5)
HGB BLD-MCNC: 5.8 G/DL — CRITICAL LOW (ref 11.5–15.5)
HGB BLD-MCNC: 7.2 G/DL — LOW (ref 11.5–15.5)
HGB BLD-MCNC: 8.8 G/DL — LOW (ref 11.5–15.5)
HYPOCHROMIA BLD QL: SLIGHT — SIGNIFICANT CHANGE UP
IMM GRANULOCYTES NFR BLD AUTO: 0.3 % — SIGNIFICANT CHANGE UP (ref 0–0.9)
IMM GRANULOCYTES NFR BLD AUTO: 0.3 % — SIGNIFICANT CHANGE UP (ref 0–0.9)
INR BLD: 1 RATIO — SIGNIFICANT CHANGE UP (ref 0.88–1.16)
LACTATE BLDV-MCNC: 0.7 MMOL/L — SIGNIFICANT CHANGE UP (ref 0.5–2)
LACTATE SERPL-SCNC: 0.9 MMOL/L — SIGNIFICANT CHANGE UP (ref 0.7–2)
LDH SERPL L TO P-CCNC: 190 U/L — SIGNIFICANT CHANGE UP (ref 120–225)
LYMPHOCYTES # BLD AUTO: 2.43 K/UL — SIGNIFICANT CHANGE UP (ref 1–3.3)
LYMPHOCYTES # BLD AUTO: 2.92 K/UL — SIGNIFICANT CHANGE UP (ref 1–3.3)
LYMPHOCYTES # BLD AUTO: 20.2 % — SIGNIFICANT CHANGE UP (ref 13–44)
LYMPHOCYTES # BLD AUTO: 29.3 % — SIGNIFICANT CHANGE UP (ref 13–44)
MAGNESIUM SERPL-MCNC: 1.8 MG/DL — SIGNIFICANT CHANGE UP (ref 1.6–2.6)
MAGNESIUM SERPL-MCNC: 2.2 MG/DL — SIGNIFICANT CHANGE UP (ref 1.6–2.6)
MANUAL SMEAR VERIFICATION: SIGNIFICANT CHANGE UP
MCHC RBC-ENTMCNC: 23.6 PG — LOW (ref 27–34)
MCHC RBC-ENTMCNC: 23.7 PG — LOW (ref 27–34)
MCHC RBC-ENTMCNC: 24.5 PG — LOW (ref 27–34)
MCHC RBC-ENTMCNC: 25.5 PG — LOW (ref 27–34)
MCHC RBC-ENTMCNC: 30.2 GM/DL — LOW (ref 32–36)
MCHC RBC-ENTMCNC: 30.6 GM/DL — LOW (ref 32–36)
MCHC RBC-ENTMCNC: 30.8 GM/DL — LOW (ref 32–36)
MCHC RBC-ENTMCNC: 32.5 GM/DL — SIGNIFICANT CHANGE UP (ref 32–36)
MCV RBC AUTO: 76.7 FL — LOW (ref 80–100)
MCV RBC AUTO: 78.4 FL — LOW (ref 80–100)
MCV RBC AUTO: 78.6 FL — LOW (ref 80–100)
MCV RBC AUTO: 80.2 FL — SIGNIFICANT CHANGE UP (ref 80–100)
MONOCYTES # BLD AUTO: 0.7 K/UL — SIGNIFICANT CHANGE UP (ref 0–0.9)
MONOCYTES # BLD AUTO: 0.72 K/UL — SIGNIFICANT CHANGE UP (ref 0–0.9)
MONOCYTES NFR BLD AUTO: 5.8 % — SIGNIFICANT CHANGE UP (ref 2–14)
MONOCYTES NFR BLD AUTO: 7.2 % — SIGNIFICANT CHANGE UP (ref 2–14)
NEUTROPHILS # BLD AUTO: 6.15 K/UL — SIGNIFICANT CHANGE UP (ref 1.8–7.4)
NEUTROPHILS # BLD AUTO: 8.59 K/UL — HIGH (ref 1.8–7.4)
NEUTROPHILS NFR BLD AUTO: 61.8 % — SIGNIFICANT CHANGE UP (ref 43–77)
NEUTROPHILS NFR BLD AUTO: 71.7 % — SIGNIFICANT CHANGE UP (ref 43–77)
NRBC # BLD: 0 /100 WBCS — SIGNIFICANT CHANGE UP (ref 0–0)
PCO2 BLDV: 32 MMHG — LOW (ref 39–42)
PH BLDV: 7.34 — SIGNIFICANT CHANGE UP (ref 7.32–7.43)
PHOSPHATE SERPL-MCNC: 2.7 MG/DL — SIGNIFICANT CHANGE UP (ref 2.5–4.5)
PHOSPHATE SERPL-MCNC: 2.7 MG/DL — SIGNIFICANT CHANGE UP (ref 2.5–4.5)
PLAT MORPH BLD: NORMAL — SIGNIFICANT CHANGE UP
PLATELET # BLD AUTO: 190 K/UL — SIGNIFICANT CHANGE UP (ref 150–400)
PLATELET # BLD AUTO: 225 K/UL — SIGNIFICANT CHANGE UP (ref 150–400)
PLATELET # BLD AUTO: 228 K/UL — SIGNIFICANT CHANGE UP (ref 150–400)
PLATELET # BLD AUTO: 232 K/UL — SIGNIFICANT CHANGE UP (ref 150–400)
PLATELET COUNT - ESTIMATE: NORMAL — SIGNIFICANT CHANGE UP
PO2 BLDV: 51 MMHG — SIGNIFICANT CHANGE UP
POIKILOCYTOSIS BLD QL AUTO: SLIGHT — SIGNIFICANT CHANGE UP
POST UNIT NUMBER: SIGNIFICANT CHANGE UP
POTASSIUM BLDV-SCNC: 2.5 MMOL/L — CRITICAL LOW (ref 3.5–5.1)
POTASSIUM SERPL-MCNC: 3.1 MMOL/L — LOW (ref 3.5–5.3)
POTASSIUM SERPL-MCNC: 3.8 MMOL/L — SIGNIFICANT CHANGE UP (ref 3.5–5.3)
POTASSIUM SERPL-MCNC: 3.9 MMOL/L — SIGNIFICANT CHANGE UP (ref 3.5–5.3)
POTASSIUM SERPL-SCNC: 3.1 MMOL/L — LOW (ref 3.5–5.3)
POTASSIUM SERPL-SCNC: 3.8 MMOL/L — SIGNIFICANT CHANGE UP (ref 3.5–5.3)
POTASSIUM SERPL-SCNC: 3.9 MMOL/L — SIGNIFICANT CHANGE UP (ref 3.5–5.3)
PROT SERPL-MCNC: 4.7 G/DL — LOW (ref 6–8.3)
PROTHROM AB SERPL-ACNC: 11.9 SEC — SIGNIFICANT CHANGE UP (ref 10.5–13.4)
RBC # BLD: 2.45 M/UL — LOW (ref 3.8–5.2)
RBC # BLD: 3.05 M/UL — LOW (ref 3.8–5.2)
RBC # BLD: 3.59 M/UL — LOW (ref 3.8–5.2)
RBC # BLD: 4.15 M/UL — SIGNIFICANT CHANGE UP (ref 3.8–5.2)
RBC # FLD: 17.6 % — HIGH (ref 10.3–14.5)
RBC # FLD: 17.8 % — HIGH (ref 10.3–14.5)
RBC # FLD: 17.8 % — HIGH (ref 10.3–14.5)
RBC # FLD: 19.1 % — HIGH (ref 10.3–14.5)
RBC BLD AUTO: ABNORMAL
SAO2 % BLDV: 84 % — SIGNIFICANT CHANGE UP
SODIUM SERPL-SCNC: 138 MMOL/L — SIGNIFICANT CHANGE UP (ref 135–145)
SODIUM SERPL-SCNC: 140 MMOL/L — SIGNIFICANT CHANGE UP (ref 135–145)
SODIUM SERPL-SCNC: 143 MMOL/L — SIGNIFICANT CHANGE UP (ref 135–145)
WBC # BLD: 12.01 K/UL — HIGH (ref 3.8–10.5)
WBC # BLD: 19.16 K/UL — HIGH (ref 3.8–10.5)
WBC # BLD: 8.46 K/UL — SIGNIFICANT CHANGE UP (ref 3.8–10.5)
WBC # BLD: 9.96 K/UL — SIGNIFICANT CHANGE UP (ref 3.8–10.5)
WBC # FLD AUTO: 12.01 K/UL — HIGH (ref 3.8–10.5)
WBC # FLD AUTO: 19.16 K/UL — HIGH (ref 3.8–10.5)
WBC # FLD AUTO: 8.46 K/UL — SIGNIFICANT CHANGE UP (ref 3.8–10.5)
WBC # FLD AUTO: 9.96 K/UL — SIGNIFICANT CHANGE UP (ref 3.8–10.5)

## 2023-03-12 PROCEDURE — 71045 X-RAY EXAM CHEST 1 VIEW: CPT | Mod: 26

## 2023-03-12 PROCEDURE — 86078 PHYS BLOOD BANK SERV REACTJ: CPT

## 2023-03-12 RX ORDER — SODIUM CHLORIDE 9 MG/ML
1000 INJECTION, SOLUTION INTRAVENOUS
Refills: 0 | Status: DISCONTINUED | OUTPATIENT
Start: 2023-03-12 | End: 2023-03-12

## 2023-03-12 RX ORDER — HYDROMORPHONE HYDROCHLORIDE 2 MG/ML
1 INJECTION INTRAMUSCULAR; INTRAVENOUS; SUBCUTANEOUS ONCE
Refills: 0 | Status: DISCONTINUED | OUTPATIENT
Start: 2023-03-12 | End: 2023-03-12

## 2023-03-12 RX ORDER — DIPHENHYDRAMINE HCL 50 MG
50 CAPSULE ORAL ONCE
Refills: 0 | Status: COMPLETED | OUTPATIENT
Start: 2023-03-12 | End: 2023-03-12

## 2023-03-12 RX ORDER — EPINEPHRINE 0.3 MG/.3ML
0.3 INJECTION INTRAMUSCULAR; SUBCUTANEOUS ONCE
Refills: 0 | Status: COMPLETED | OUTPATIENT
Start: 2023-03-12 | End: 2023-03-12

## 2023-03-12 RX ORDER — ACETAMINOPHEN 500 MG
1000 TABLET ORAL ONCE
Refills: 0 | Status: COMPLETED | OUTPATIENT
Start: 2023-03-12 | End: 2023-03-12

## 2023-03-12 RX ORDER — CHLORHEXIDINE GLUCONATE 213 G/1000ML
1 SOLUTION TOPICAL DAILY
Refills: 0 | Status: DISCONTINUED | OUTPATIENT
Start: 2023-03-12 | End: 2023-03-14

## 2023-03-12 RX ORDER — KETOROLAC TROMETHAMINE 30 MG/ML
30 SYRINGE (ML) INJECTION EVERY 6 HOURS
Refills: 0 | Status: DISCONTINUED | OUTPATIENT
Start: 2023-03-12 | End: 2023-03-12

## 2023-03-12 RX ORDER — MORPHINE SULFATE 50 MG/1
4 CAPSULE, EXTENDED RELEASE ORAL ONCE
Refills: 0 | Status: DISCONTINUED | OUTPATIENT
Start: 2023-03-12 | End: 2023-03-12

## 2023-03-12 RX ORDER — FENTANYL CITRATE 50 UG/ML
25 INJECTION INTRAVENOUS EVERY 6 HOURS
Refills: 0 | Status: DISCONTINUED | OUTPATIENT
Start: 2023-03-12 | End: 2023-03-13

## 2023-03-12 RX ORDER — FENTANYL CITRATE 50 UG/ML
0.25 INJECTION INTRAVENOUS EVERY 6 HOURS
Refills: 0 | Status: DISCONTINUED | OUTPATIENT
Start: 2023-03-12 | End: 2023-03-12

## 2023-03-12 RX ORDER — DEXAMETHASONE 0.5 MG/5ML
5 ELIXIR ORAL ONCE
Refills: 0 | Status: COMPLETED | OUTPATIENT
Start: 2023-03-12 | End: 2023-03-12

## 2023-03-12 RX ORDER — CHLORHEXIDINE GLUCONATE 213 G/1000ML
1 SOLUTION TOPICAL
Refills: 0 | Status: DISCONTINUED | OUTPATIENT
Start: 2023-03-12 | End: 2023-03-12

## 2023-03-12 RX ORDER — FAMOTIDINE 10 MG/ML
20 INJECTION INTRAVENOUS ONCE
Refills: 0 | Status: COMPLETED | OUTPATIENT
Start: 2023-03-12 | End: 2023-03-12

## 2023-03-12 RX ORDER — HYDROMORPHONE HYDROCHLORIDE 2 MG/ML
0.5 INJECTION INTRAMUSCULAR; INTRAVENOUS; SUBCUTANEOUS ONCE
Refills: 0 | Status: DISCONTINUED | OUTPATIENT
Start: 2023-03-12 | End: 2023-03-12

## 2023-03-12 RX ORDER — IPRATROPIUM/ALBUTEROL SULFATE 18-103MCG
3 AEROSOL WITH ADAPTER (GRAM) INHALATION ONCE
Refills: 0 | Status: COMPLETED | OUTPATIENT
Start: 2023-03-12 | End: 2023-03-12

## 2023-03-12 RX ORDER — SODIUM CHLORIDE 9 MG/ML
10 INJECTION INTRAMUSCULAR; INTRAVENOUS; SUBCUTANEOUS
Refills: 0 | Status: DISCONTINUED | OUTPATIENT
Start: 2023-03-12 | End: 2023-03-14

## 2023-03-12 RX ADMIN — EPINEPHRINE 0.3 MILLIGRAM(S): 0.3 INJECTION INTRAMUSCULAR; SUBCUTANEOUS at 15:08

## 2023-03-12 RX ADMIN — HYDROMORPHONE HYDROCHLORIDE 0.5 MILLIGRAM(S): 2 INJECTION INTRAMUSCULAR; INTRAVENOUS; SUBCUTANEOUS at 14:21

## 2023-03-12 RX ADMIN — SIMETHICONE 80 MILLIGRAM(S): 80 TABLET, CHEWABLE ORAL at 05:45

## 2023-03-12 RX ADMIN — FAMOTIDINE 20 MILLIGRAM(S): 10 INJECTION INTRAVENOUS at 15:08

## 2023-03-12 RX ADMIN — HYDROMORPHONE HYDROCHLORIDE 0.5 MILLIGRAM(S): 2 INJECTION INTRAMUSCULAR; INTRAVENOUS; SUBCUTANEOUS at 14:51

## 2023-03-12 RX ADMIN — Medication 30 MILLIGRAM(S): at 09:56

## 2023-03-12 RX ADMIN — PANTOPRAZOLE SODIUM 40 MILLIGRAM(S): 20 TABLET, DELAYED RELEASE ORAL at 05:45

## 2023-03-12 RX ADMIN — Medication 400 MILLIGRAM(S): at 19:22

## 2023-03-12 RX ADMIN — Medication 600 MILLIGRAM(S): at 00:16

## 2023-03-12 RX ADMIN — Medication 975 MILLIGRAM(S): at 05:45

## 2023-03-12 RX ADMIN — Medication 5 MILLIGRAM(S): at 15:11

## 2023-03-12 RX ADMIN — Medication 50 MILLIGRAM(S): at 15:08

## 2023-03-12 RX ADMIN — FENTANYL CITRATE 25 MICROGRAM(S): 50 INJECTION INTRAVENOUS at 20:35

## 2023-03-12 RX ADMIN — Medication 975 MILLIGRAM(S): at 06:23

## 2023-03-12 RX ADMIN — MORPHINE SULFATE 4 MILLIGRAM(S): 50 CAPSULE, EXTENDED RELEASE ORAL at 11:40

## 2023-03-12 RX ADMIN — MORPHINE SULFATE 4 MILLIGRAM(S): 50 CAPSULE, EXTENDED RELEASE ORAL at 12:00

## 2023-03-12 RX ADMIN — FENTANYL CITRATE 25 MICROGRAM(S): 50 INJECTION INTRAVENOUS at 20:50

## 2023-03-12 RX ADMIN — Medication 1000 MILLIGRAM(S): at 20:00

## 2023-03-12 RX ADMIN — Medication 30 MILLIGRAM(S): at 10:24

## 2023-03-12 NOTE — PROCEDURE NOTE - NSPROCDETAILS_GEN_ALL_CORE
location identified, draped/prepped, sterile technique used, needle inserted/introduced/connected to a pressurized flush line/hemostasis with direct pressure, dressing applied/Seldinger technique/all materials/supplies accounted for at end of procedure
guidewire recovered/lumen(s) aspirated and flushed/sterile dressing applied/sterile technique, catheter placed/ultrasound guidance with use of sterile gel and probe cove

## 2023-03-12 NOTE — CHART NOTE - NSCHARTNOTEFT_GEN_A_CORE
Post second PRBC transfusion patient developed new exhalation wheeze, throat itching/swelling, difficultly breathing, and anxiety. No urticaria noted. Bedside lung POCUS with A lines, no B lines. Given concern for possible allergic/anaphylaxis to transfusion, given 0.3 IM Epinephrine, Decadron 5mg IVP, Pepcid 20mg, and Benadryl 50 mg IVP. Less concern for volume overload given normal LV function and lack of B lines on POCUS, however this is also possible. 1 hour prior to PRBC patient had received Dilaudid for the first time, less likely reaction to Opioid given timing. Complete resolution of symptoms post medications. Discussed with Anesthesiologist Dr. Guzman and OBGYN Dr. Ward, stable to transfer to OR for emergent case. Repeat Type & Screen sent to blood bank and blood bank staff notified of possible transfusion reaction. Above care performed in conjunction with Dr. Alonso.

## 2023-03-12 NOTE — CONSULT NOTE ADULT - ASSESSMENT
31 yo F with no past medical history presents  [facility] for recurring sharp 10 out of 10 abdominal pain beginning Tuesday with accompanying nausea.   Patient was taken to the OR on 3/11 and is POD #1 s/p laparotomy with left salpingectomy secondary to ectopic pregnancy. Patient now is c/o severe left abdominal pain with drop in hemoglobin.  ICU was consulted for possible intraabdominal bleeding.      #Intra-abdominal bleeding  # Ectopic pregnancy  # S/p left salpingectomy         31 yo F with no past medical history presents  to [facility] for recurring sharp 10 out of 10 abdominal pain beginning Tuesday with accompanying nausea.   Patient was taken to the OR on 3/11 and is POD #1 s/p laparotomy with left salpingectomy secondary to ectopic pregnancy. Patient now is c/o severe left abdominal pain with drop in hemoglobin.  ICU was consulted for possible intraabdominal bleeding.      #Intra-abdominal bleeding  # Ectopic pregnancy  # S/p left salpingectomy                             ----------------------------------------------------------------------------------------------------------------------------------------------------------------  Assessment:  ********Neuro       ----------------------------------------------------------------------------------------------------------------------------------------------------------------  ******** Cardiovascular         ----------------------------------------------------------------------------------------------------------------------------------------------------------------  ********Pulmonary      ----------------------------------------------------------------------------------------------------------------------------------------------------------------  ******** Gastrointestinal      ----------------------------------------------------------------------------------------------------------------------------------------------------------------  ******** Renal        ----------------------------------------------------------------------------------------------------------------------------------------------------------------  ******** Endocrine          ----------------------------------------------------------------------------------------------------------------------------------------------------------------  ********Infectious Disease      ----------------------------------------------------------------------------------------------------------------------------------------------------------------  ******** HEME        ----------------------------------------------------------------------------------------------------------------------------------------------------------------  ******** Skin   intact skin issues      ----------------------------------------------------------------------------------------------------------------------------------------------------------------  DVT Prophylaxis: (    )   GI Prophylaxis:  (    )              ----------------------------------------------------------------------------------------------------------------------------------------------------------------  CODE STATUS      Lines Placed:      Yes      No     Date Placed      Odom:               (   )    (X)  Central Line:     (   )    (X)  Arterial Line:    (   )    (X)  Intubation:       (   )    (X)   NGT :               (   )    (X)                                   31 yo F with no past medical history presents  [facility] sharp 10 out of 10 abdominal pain beginning Tuesday with accompanying nausea.   Patient was taken to the OR on 3/11 and is POD #1 s/p laparotomy with left salpingectomy secondary to ectopic pregnancy. Patient now is c/o severe left abdominal pain with drop in hemoglobin.  ICU was consulted for possible intraabdominal bleeding.      #Intra-abdominal bleeding  # Ectopic pregnancy  # S/p left salpingectomy    ----------------------------------------------------------------------------------------------------------------------------------------------------------------  Assessment:  Neuro   - A/O x3, no issues     Cardiovascular   #Hemorraghic shock  - p/w ectopic pregnancy s/p laparotomy with left salpingectomy   -concern for hemorraghic shock 2/2 intraabdominal bleeding  -hb 5.7 (7.2)  - Will give 2 units PRBC, 2FFP, 2plts  - C/W IVF  - Obgyb following- plan for OR today   - f/u CBC post transfusion       Pulmonary    - no issues     Gastrointestinal  #Intraabdominal bleeding   -concern for intraabdominal bleeding s/p left salpingectomy   - plan as above      Renal  -no issues     Endocrine  - no issues     Infectious Disease  - no issues   - monitor for signs of infection post op      Heme  #Anemia  - Concern fo possible intraabdominal bleeding  -Will give 2PRBC, 2FFP, 2 plts  -f/u CBC post transfusion    #Concern for DIC  -f/u DIC panel   -Will give 2PRBC, 2FFP, 2 plts    Skin   - no issues       ----------------------------------------------------------------------------------------------------------------------------------------------------------------  DVT Prophylaxis: Will hold chemical dvt ppx as pt is bleeding       ----------------------------------------------------------------------------------------------------------------------------------------------------------------  CODE STATUS FULL CODE       Lines Placed:      Yes      No     Date Placed      Lei:               (   )    (X)  Central Line:     ( x  )    (X)  Arterial Line:    (  x )    ()  Intubation:       (   )    ()   NGT :               (   )    (X)                                   33 yo F with no past medical history presents  [facility] sharp 10 out of 10 abdominal pain beginning Tuesday with accompanying nausea.   Patient was taken to the OR on 3/11 and is POD #1 s/p laparotomy with left salpingectomy secondary to ectopic pregnancy. Patient now is c/o severe left abdominal pain with drop in hemoglobin.  ICU was consulted for possible intraabdominal bleeding.      #Intra-abdominal bleeding  # Ectopic pregnancy  # S/p left salpingectomy    ----------------------------------------------------------------------------------------------------------------------------------------------------------------  Assessment:  Neuro   - A/O x3, no issues     Cardiovascular   #Hemorraghic shock  - p/w ectopic pregnancy s/p laparotomy with left salpingectomy   -concern for hemorraghic shock 2/2 intraabdominal bleeding  -hb 5.7 (7.2)  - Will give 2 units PRBC, 2FFP, 2plts  - C/W IVF  - Obgyb following- plan for OR today   - f/u CBC post transfusion       Pulmonary    - no issues     Gastrointestinal  #Intraabdominal bleeding   -concern for intraabdominal bleeding s/p left salpingectomy   - plan as above      Renal  -no issues     Endocrine  - no issues     Infectious Disease  - no issues   - monitor for signs of infection post op      Heme  #Anemia  - Concern fo possible intraabdominal bleeding  -Will give 2PRBC, 2FFP, 2 plts  -f/u CBC post transfusion  -plan as above    #Concern for DIC  -f/u DIC panel   -Will give 2PRBC, 2FFP, 2 plts    Skin   - no issues       ----------------------------------------------------------------------------------------------------------------------------------------------------------------  DVT Prophylaxis: Will hold chemical dvt ppx as pt is bleeding       ----------------------------------------------------------------------------------------------------------------------------------------------------------------  CODE STATUS FULL CODE       Lines Placed:      Yes      No     Date Placed      Lei:               (   )    (X)  Central Line:     ( x  )    (X)  Arterial Line:    (  x )    ()  Intubation:       (   )    ()   NGT :               (   )    (X)                                   33 yo F with no past medical history presents  [facility] sharp 10 out of 10 abdominal pain beginning Tuesday with accompanying nausea.   Patient was taken to the OR on 3/11 and is POD #1 s/p laparotomy with left salpingectomy secondary to ectopic pregnancy. Patient now is c/o severe left abdominal pain with drop in hemoglobin.  ICU was consulted for possible intraabdominal bleeding.    Assessment:  #Intra-abdominal bleeding  # Ectopic pregnancy  # S/p left salpingectomy      Neuro   - A/O x3, no issues     Cardiovascular   #Hemorraghic shock  - p/w ectopic pregnancy s/p laparotomy with left salpingectomy   -concern for hemorraghic shock 2/2 intraabdominal bleeding  -hb 5.7 (7.2)  - Will give 2 units PRBC, 2FFP, 2plts  - C/W IVF  - Obgyb following- plan for OR today   - f/u CBC post transfusion       Pulmonary    - no issues     Gastrointestinal  #Intraabdominal bleeding   -concern for intraabdominal bleeding s/p left salpingectomy   - plan as above      Renal  -no issues     Endocrine  - no issues     Infectious Disease  - no issues   - monitor for signs of infection post op      Heme  #Anemia  - Concern fo possible intraabdominal bleeding  -Will give 2PRBC, 2FFP, 2 plts  -f/u CBC post transfusion  -plan as above    #Concern for DIC  -f/u DIC panel   -Will give 2PRBC, 2FFP, 2 plts    Skin   - no issues       DVT Prophylaxis: Will hold chemical dvt ppx as pt is bleeding     CODE STATUS FULL CODE       Lines Placed:      Yes      No     Date Placed      Odom:               (   )    (X)  Central Line:     ( x  )    (X)  Arterial Line:    (  x )    ()  Intubation:       (   )    ()   NGT :               (   )    (X)

## 2023-03-12 NOTE — PROGRESS NOTE ADULT - SUBJECTIVE AND OBJECTIVE BOX
Patient seen with Dr. Ward at bedside. C/o abd pain mostly on the left side requesting pain meds. Pt also feels dizziness and SOB. Voiding without difficulty. No flatus. Tolerating regular diet.  Denies HA, CP, N/V/D, palpitations, worsening vaginal bleeding.  Vital Signs Last 24 Hrs  T(C): 36.7 (12 Mar 2023 09:24), Max: 36.9 (12 Mar 2023 05:50)  T(F): 98 (12 Mar 2023 09:24), Max: 98.4 (12 Mar 2023 05:50)  HR: 77 (12 Mar 2023 05:50) (76 - 81)  BP: 92/48 (12 Mar 2023 05:50) (89/41 - 98/59)  BP(mean): 58 (12 Mar 2023 05:50) (51 - 58)  RR: 16 (12 Mar 2023 09:24) (16 - 18)  SpO2: 100% (12 Mar 2023 09:24) (96% - 100%)    Parameters below as of 12 Mar 2023 09:24  Patient On (Oxygen Delivery Method): room air        Gen: A&O x 3, NAD  Chest: CTABL  Cardiac: S1+S2+ RRR  Abdomen: diffused tenderness and distension, Incision C/D/I   Gyn: no lochia  Extremities: Nontender, no edema                        7.2    9.96  )-----------( 225      ( 12 Mar 2023 06:28 )             23.4       A/P: POD #1 s/p laparotomy left salpingectomy secondary to ectopic pregnancy with symptomatic acute blood     -Pain management as needed  -cont post op care  -adv diet to regular   -OOB and ambulate  -f/u Rpt CBC   -encourage insentive spirometer use  -Encourage breastfeeding   -d/w   Patient seen with Dr. Ward at bedside. C/o abd pain mostly on the left side requesting pain meds. Pt also feels dizziness and SOB. Voiding without difficulty. No flatus. Tolerating regular diet.  Denies HA, CP, N/V/D, palpitations, worsening vaginal bleeding.  Vital Signs Last 24 Hrs  T(C): 36.7 (12 Mar 2023 09:24), Max: 36.9 (12 Mar 2023 05:50)  T(F): 98 (12 Mar 2023 09:24), Max: 98.4 (12 Mar 2023 05:50)  HR: 77 (12 Mar 2023 05:50) (76 - 81)  BP: 92/48 (12 Mar 2023 05:50) (89/41 - 98/59)  BP(mean): 58 (12 Mar 2023 05:50) (51 - 58)  RR: 16 (12 Mar 2023 09:24) (16 - 18)  SpO2: 100% (12 Mar 2023 09:24) (96% - 100%)    Parameters below as of 12 Mar 2023 09:24  Patient On (Oxygen Delivery Method): room air        Gen: A&O x 3, NAD  Chest: CTABL  Cardiac: S1+S2+ RRR  Abdomen: diffused tenderness and distension, Incision C/D/I   Gyn: no lochia  Extremities: Calves Nontender, no edema b/l                        7.2    9.96  )-----------( 225      ( 12 Mar 2023 06:28 )             23.4       A/P: POD #1 s/p laparotomy left salpingectomy secondary to ectopic pregnancy with symptomatic acute blood loss anemia, distended abd r/o pelvic bleeding  -Plan for CT abd and pelvis with IV contrast  -Consent obtained for prbc blood transfusion x 2 units  -Toradol for pain  -Pain management as needed  -cont post op care  -Keep NPO  -CBC and BMP  -Pt was seen with Dr. Ward

## 2023-03-12 NOTE — PROGRESS NOTE ADULT - SUBJECTIVE AND OBJECTIVE BOX
@ 11:30a   Pt was re-evaluated at bedside. She appeared to be pale and was continuing to c/o increased abd pain. No pain relief with Toradol IV push.   She was in the process of getting another IV line placed. CBC and BMP ordered.  CT was called to expedite the study and they were going to send transport for her.   D/w Dr. Ward    1240p  Pt CBC showed  @ 11:30a   Pt was re-evaluated at bedside. She appeared to be pale and was continuing to c/o increased abd pain. No pain relief with Toradol IV push.   She was in the process of getting another IV line placed. CBC and BMP ordered.  CT was called to expedite the study and they were going to send transport for her.   D/w Dr. Ward    1240p  Pt CBC showed H&H of 5.8/19.  Pt declined going to CT due to pain.  Blood transfusion to be started STAT.  Nursing supervisor Ms. Avendano and Anesthesiologist made aware of pt's need to be taken back to the OR.   D/w Dr. Ward    1310  ICU consult at bedside  They will provide central line and A-line access and allow faster transfusion of blood cells  Pt to be transported to the ICU for care while she is being stabilized for OR  Dr. Ward at bedside  Consent for procedure obtained by him.

## 2023-03-12 NOTE — CHART NOTE - NSCHARTNOTEFT_GEN_A_CORE
Pt seen at bedside, states after fentanyl was given to her a little while ago she felt much better. Her pain was at the A-line and h/a. Now feels better denies n/v, cp; sob; palpitations; or dizziness    T(C): 37.3 (03-12-23 @ 20:00), Max: 37.3 (03-12-23 @ 20:00)  HR: 67 (03-12-23 @ 20:00) (67 - 105)  BP: 106/54 (03-12-23 @ 16:00) (92/48 - 118/68)  RR: 16 (03-12-23 @ 20:00) (9 - 20)  SpO2: 98% (03-12-23 @ 20:00) (93% - 100%)    abd: soft/nt, dressing in place C/D/I  pelvic: no vb  ext: venodynes in place; no calf pain    a/p POD #1/0 s/p mini lap salpingectomy, then was reop for suspected intra-abdominal bleed   cont close monitor   cont post op care  cont ICU management  s/p 2units PRBC  d/w dr Rausch attending on call

## 2023-03-12 NOTE — CONSULT NOTE ADULT - ATTENDING COMMENTS
31 y/o female with left tubal ectopic pregnancy s/p left salpingectomy. ICU consulted for anemia with concern for post operative bleeding.     Assessment:  1. Post operative bleeding  2. Acute blood loss anemia  3. Left tubal ectopic pregnancy   4. Hypokalemia     Plan:  - Admit to ICU   - Cont. blood resuscitation in prep for OR  - Right IJ introducer placed and left radial arterial line  - close hemodynamic monitoring  - Post transfusion CBC reported  - Supplement potassium  - OB to take patient back to the OR today   - Monitor for transfusion reaction as patient with wheezing/itching post second unit PRBC transfusion  - Given Epinephrine, H1 and H2 blockers and IV dexamethasone   - Repeat Type and screen sent  - Discussed with patient's family at bedside with  (ID [id number]) and updated about the plan

## 2023-03-12 NOTE — CONSULT NOTE ADULT - SUBJECTIVE AND OBJECTIVE BOX
Patient is a 32y old  Female who presents with a chief complaint of left lower abdominal pain (12 Mar 2023 13:26)      HPI:  33 yo F with no past medical history [facility]  [facility] sharp 10 out of 10 abdominal pain beginning Tuesday with accompanying nausea.  Patient's LMP 2023. Hcg was positive, and US findings concerning for left ectopic adnexal pregnancy. Patient was taken to the OR on 3/11 and is p s/p laparotomy with left salpingectomy secondary to ectopic pregnancy. Patient this morning was c/o left abdominal pain that started to get worse this morning.  ICU was consulted for possible intraabdominal bleeding.       REVIEW OF SYSTEMS:    CONSTITUTIONAL: + weakness ,no  fevers or chills  EYES/ENT: No visual changes;  No vertigo or throat pain   NECK: No pain or stiffness  RESPIRATORY: No cough, wheezing, hemoptysis; No shortness of breath  CARDIOVASCULAR: No chest pain or palpitations  GASTROINTESTINAL: + abdominal no epigastric pain. No nausea, vomiting, or hematemesis; No diarrhea or constipation. No melena or hematochezia.  GENITOURINARY: No dysuria, frequency or hematuria  NEUROLOGICAL: No numbness or weakness  SKIN: No itching, burning, rashes, or lesions   All other review of systems is negative unless indicated above.         (10 Mar 2023 22:17)      Allergies    No Known Allergies    Intolerances        MEDICATIONS  (STANDING):  chlorhexidine 4% Liquid 1 Application(s) Topical <User Schedule>  lactated ringers. 1000 milliLiter(s) (125 mL/Hr) IV Continuous <Continuous>    MEDICATIONS  (PRN):  ondansetron Injectable 4 milliGRAM(s) IV Push every 4 hours PRN Nausea and/or Vomiting      Daily     Daily     Drug Dosing Weight  Height (cm): 152.4 (11 Mar 2023 06:46)  Weight (kg): 52.2 (10 Mar 2023 18:07)  BMI (kg/m2): 22.5 (11 Mar 2023 06:46)  BSA (m2): 1.48 (11 Mar 2023 06:46)    PAST MEDICAL & SURGICAL HISTORY:  No pertinent past medical history      S/P           FAMILY HISTORY:      SOCIAL HISTORY:    ADVANCE DIRECTIVES:    REVIEW OF SYSTEMS:              ICU Vital Signs Last 24 Hrs  T(C): 36.7 (12 Mar 2023 09:24), Max: 36.9 (12 Mar 2023 05:50)  T(F): 98 (12 Mar 2023 09:24), Max: 98.4 (12 Mar 2023 05:50)  HR: 77 (12 Mar 2023 05:50) (76 - 77)  BP: 92/48 (12 Mar 2023 05:50) (92/48 - 96/45)  BP(mean): 58 (12 Mar 2023 05:50) (58 - 58)  ABP: --  ABP(mean): --  RR: 16 (12 Mar 2023 09:24) (16 - 16)  SpO2: 100% (12 Mar 2023 09:24) (98% - 100%)    O2 Parameters below as of 12 Mar 2023 09:24  Patient On (Oxygen Delivery Method): room air                I&O's Detail    11 Mar 2023 06:01  -  12 Mar 2023 07:00  --------------------------------------------------------  IN:  Total IN: 0 mL    OUT:    Indwelling Catheter - Urethral (mL): 1300 mL    Voided (mL): 200 mL  Total OUT: 1500 mL    Total NET: -1500 mL          PHYSICAL EXAM:    GENERAL: pale  HEAD:  Atraumatic, Normocephalic  EYES: EOMI, PERRLA, conjunctiva and sclera clear  ENMT: No tonsillar erythema, exudates, or enlargement; Moist mucous membranes, Good dentition, No lesions  NECK: Supple, No JVD, Normal thyroid  NERVOUS SYSTEM:  Alert & Oriented X3, Good concentration; Motor Strength 5/5 B/L upper and lower extremities; DTRs 2+ intact and symmetric  CHEST/LUNG: Clear to percussion bilaterally; No rales, rhonchi, wheezing, or rubs  HEART: Regular rate and rhythm; No murmurs, rubs, or gallops  ABDOMEN: Soft, Nontender, Nondistended; Bowel sounds present  EXTREMITIES:  2+ Peripheral Pulses, No clubbing, cyanosis, or edema  LYMPH: No lymphadenopathy noted  SKIN: No rashes or lesions    LABS:  CBC Full  -  ( 12 Mar 2023 11:38 )  WBC Count : 8.46 K/uL  RBC Count : 2.45 M/uL  Hemoglobin : 5.8 g/dL  Hematocrit : 19.2 %  Platelet Count - Automated : 190 K/uL  Mean Cell Volume : 78.4 fl  Mean Cell Hemoglobin : 23.7 pg  Mean Cell Hemoglobin Concentration : 30.2 gm/dL  Auto Neutrophil # : x  Auto Lymphocyte # : x  Auto Monocyte # : x  Auto Eosinophil # : x  Auto Basophil # : x  Auto Neutrophil % : x  Auto Lymphocyte % : x  Auto Monocyte % : x  Auto Eosinophil % : x  Auto Basophil % : x    03-12    140  |  111<H>  |  6<L>  ----------------------------<  76  3.8   |  20<L>  |  0.36<L>    Ca    7.4<L>      12 Mar 2023 11:38    TPro  8.2  /  Alb  3.4<L>  /  TBili  0.2  /  DBili  x   /  AST  19  /  ALT  21  /  AlkPhos  99  03-10    CAPILLARY BLOOD GLUCOSE        PT/INR - ( 10 Mar 2023 22:30 )   PT: 11.6 sec;   INR: 0.98 ratio         PTT - ( 10 Mar 2023 22:30 )  PTT:24.7 sec  Urinalysis Basic - ( 10 Mar 2023 20:27 )    Color: Yellow / Appearance: Clear / S.025 / pH: x  Gluc: x / Ketone: Negative  / Bili: Negative / Urobili: Negative   Blood: x / Protein: Negative / Nitrite: Negative   Leuk Esterase: Negative / RBC: 2-5 /HPF / WBC 11-25 /HPF   Sq Epi: x / Non Sq Epi: Few /HPF / Bacteria: Trace /HPF              EKG:    ECHO, US:    RADIOLOGY:    CRITICAL CARE TIME SPENT:   Patient is a 32y old  Female who presents with a chief complaint of left lower abdominal pain (12 Mar 2023 13:26)      HPI:  31 yo F with no past medical history [facility]  [facility] sharp 10 out of 10 abdominal pain beginning Tuesday with accompanying nausea.  Patient's LMP 2023. Hcg was positive, and US findings concerning for left ectopic adnexal pregnancy. Patient was taken to the OR on 3/11 and is p s/p laparotomy with left salpingectomy secondary to ectopic pregnancy. Patient this morning was c/o left abdominal pain that started to get worse this morning.  ICU was consulted for possible intraabdominal bleeding.       REVIEW OF SYSTEMS:    CONSTITUTIONAL: + weakness ,no  fevers or chills  EYES/ENT: No visual changes;  No vertigo or throat pain   NECK: No pain or stiffness  RESPIRATORY: No cough, wheezing, hemoptysis; No shortness of breath  CARDIOVASCULAR: No chest pain or palpitations  GASTROINTESTINAL: + abdominal no epigastric pain. No nausea, vomiting, or hematemesis; No diarrhea or constipation. No melena or hematochezia.  GENITOURINARY: No dysuria, frequency or hematuria  NEUROLOGICAL: No numbness or weakness  SKIN: No itching, burning, rashes, or lesions   All other review of systems is negative unless indicated above.         (10 Mar 2023 22:17)      Allergies    No Known Allergies    Intolerances        MEDICATIONS  (STANDING):  chlorhexidine 4% Liquid 1 Application(s) Topical <User Schedule>  lactated ringers. 1000 milliLiter(s) (125 mL/Hr) IV Continuous <Continuous>    MEDICATIONS  (PRN):  ondansetron Injectable 4 milliGRAM(s) IV Push every 4 hours PRN Nausea and/or Vomiting      Daily     Daily     Drug Dosing Weight  Height (cm): 152.4 (11 Mar 2023 06:46)  Weight (kg): 52.2 (10 Mar 2023 18:07)  BMI (kg/m2): 22.5 (11 Mar 2023 06:46)  BSA (m2): 1.48 (11 Mar 2023 06:46)    PAST MEDICAL & SURGICAL HISTORY:  No pertinent past medical history      S/P           FAMILY HISTORY:      SOCIAL HISTORY:    ADVANCE DIRECTIVES:    REVIEW OF SYSTEMS:              ICU Vital Signs Last 24 Hrs  T(C): 36.7 (12 Mar 2023 09:24), Max: 36.9 (12 Mar 2023 05:50)  T(F): 98 (12 Mar 2023 09:24), Max: 98.4 (12 Mar 2023 05:50)  HR: 77 (12 Mar 2023 05:50) (76 - 77)  BP: 92/48 (12 Mar 2023 05:50) (92/48 - 96/45)  BP(mean): 58 (12 Mar 2023 05:50) (58 - 58)  ABP: --  ABP(mean): --  RR: 16 (12 Mar 2023 09:24) (16 - 16)  SpO2: 100% (12 Mar 2023 09:24) (98% - 100%)    O2 Parameters below as of 12 Mar 2023 09:24  Patient On (Oxygen Delivery Method): room air                I&O's Detail    11 Mar 2023 06:01  -  12 Mar 2023 07:00  --------------------------------------------------------  IN:  Total IN: 0 mL    OUT:    Indwelling Catheter - Urethral (mL): 1300 mL    Voided (mL): 200 mL  Total OUT: 1500 mL    Total NET: -1500 mL          PHYSICAL EXAM:    GENERAL: pale  HEAD:  Atraumatic, Normocephalic  EYES:  conjunctiva and sclera clear  NECK: Supple, No JVD, Normal thyroid  NERVOUS SYSTEM:  Alert & Oriented X3,  Motor Strength 5/5 B/L upper and lower extremities; DTRs 2+ intact and symmetric  CHEST/LUNG: Clear to percussion bilaterally; No rales, rhonchi, wheezing, or rubs  HEART: Regular rate and rhythm; No murmurs, rubs, or gallops  ABDOMEN: LLQ abdominal tenderness   EXTREMITIES:  2+ Peripheral Pulses, No clubbing, cyanosis, or edema  LYMPH: No lymphadenopathy noted  SKIN: No rashes or lesions    LABS:  CBC Full  -  ( 12 Mar 2023 11:38 )  WBC Count : 8.46 K/uL  RBC Count : 2.45 M/uL  Hemoglobin : 5.8 g/dL  Hematocrit : 19.2 %  Platelet Count - Automated : 190 K/uL  Mean Cell Volume : 78.4 fl  Mean Cell Hemoglobin : 23.7 pg  Mean Cell Hemoglobin Concentration : 30.2 gm/dL  Auto Neutrophil # : x  Auto Lymphocyte # : x  Auto Monocyte # : x  Auto Eosinophil # : x  Auto Basophil # : x  Auto Neutrophil % : x  Auto Lymphocyte % : x  Auto Monocyte % : x  Auto Eosinophil % : x  Auto Basophil % : x    03-12    140  |  111<H>  |  6<L>  ----------------------------<  76  3.8   |  20<L>  |  0.36<L>    Ca    7.4<L>      12 Mar 2023 11:38    TPro  8.2  /  Alb  3.4<L>  /  TBili  0.2  /  DBili  x   /  AST  19  /  ALT  21  /  AlkPhos  99  03-10    CAPILLARY BLOOD GLUCOSE        PT/INR - ( 10 Mar 2023 22:30 )   PT: 11.6 sec;   INR: 0.98 ratio         PTT - ( 10 Mar 2023 22:30 )  PTT:24.7 sec  Urinalysis Basic - ( 10 Mar 2023 20:27 )    Color: Yellow / Appearance: Clear / S.025 / pH: x  Gluc: x / Ketone: Negative  / Bili: Negative / Urobili: Negative   Blood: x / Protein: Negative / Nitrite: Negative   Leuk Esterase: Negative / RBC: 2-5 /HPF / WBC 11-25 /HPF   Sq Epi: x / Non Sq Epi: Few /HPF / Bacteria: Trace /HPF              EKG:    ECHO, US:    RADIOLOGY:    CRITICAL CARE TIME SPENT:

## 2023-03-12 NOTE — PROGRESS NOTE ADULT - ASSESSMENT
A/P: POD #1 s/p laparotomy left salpingectomy secondary to ectopic pregnancy with symptomatic acute blood loss anemia, distended abd r/o pelvic bleeding  -Plan for CT abd and pelvis with IV contrast  -Consent obtained for prbc blood transfusion x 2 units  -Toradol for pain  -Pain management as needed  -cont post op care  -Keep NPO  -CBC and BMP  -Pt was seen with Dr. Ward

## 2023-03-13 DIAGNOSIS — Z90.79 ACQUIRED ABSENCE OF OTHER GENITAL ORGAN(S): ICD-10-CM

## 2023-03-13 DIAGNOSIS — R10.9 UNSPECIFIED ABDOMINAL PAIN: ICD-10-CM

## 2023-03-13 LAB
ANION GAP SERPL CALC-SCNC: 8 MMOL/L — SIGNIFICANT CHANGE UP (ref 5–17)
BUN SERPL-MCNC: 7 MG/DL — SIGNIFICANT CHANGE UP (ref 7–18)
CALCIUM SERPL-MCNC: 7.7 MG/DL — LOW (ref 8.4–10.5)
CHLORIDE SERPL-SCNC: 109 MMOL/L — HIGH (ref 96–108)
CO2 SERPL-SCNC: 22 MMOL/L — SIGNIFICANT CHANGE UP (ref 22–31)
CREAT SERPL-MCNC: 0.47 MG/DL — LOW (ref 0.5–1.3)
EGFR: 131 ML/MIN/1.73M2 — SIGNIFICANT CHANGE UP
GLUCOSE SERPL-MCNC: 110 MG/DL — HIGH (ref 70–99)
HAPTOGLOB SERPL-MCNC: 132 MG/DL — SIGNIFICANT CHANGE UP (ref 34–200)
HCT VFR BLD CALC: 33.9 % — LOW (ref 34.5–45)
HGB BLD-MCNC: 10.9 G/DL — LOW (ref 11.5–15.5)
MAGNESIUM SERPL-MCNC: 2.2 MG/DL — SIGNIFICANT CHANGE UP (ref 1.6–2.6)
MCHC RBC-ENTMCNC: 25.2 PG — LOW (ref 27–34)
MCHC RBC-ENTMCNC: 32.2 GM/DL — SIGNIFICANT CHANGE UP (ref 32–36)
MCV RBC AUTO: 78.5 FL — LOW (ref 80–100)
NRBC # BLD: 0 /100 WBCS — SIGNIFICANT CHANGE UP (ref 0–0)
PHOSPHATE SERPL-MCNC: 3.7 MG/DL — SIGNIFICANT CHANGE UP (ref 2.5–4.5)
PLATELET # BLD AUTO: 237 K/UL — SIGNIFICANT CHANGE UP (ref 150–400)
POTASSIUM SERPL-MCNC: 4.1 MMOL/L — SIGNIFICANT CHANGE UP (ref 3.5–5.3)
POTASSIUM SERPL-SCNC: 4.1 MMOL/L — SIGNIFICANT CHANGE UP (ref 3.5–5.3)
RBC # BLD: 4.32 M/UL — SIGNIFICANT CHANGE UP (ref 3.8–5.2)
RBC # FLD: 17.4 % — HIGH (ref 10.3–14.5)
SODIUM SERPL-SCNC: 139 MMOL/L — SIGNIFICANT CHANGE UP (ref 135–145)
WBC # BLD: 16.9 K/UL — HIGH (ref 3.8–10.5)
WBC # FLD AUTO: 16.9 K/UL — HIGH (ref 3.8–10.5)

## 2023-03-13 PROCEDURE — 99222 1ST HOSP IP/OBS MODERATE 55: CPT

## 2023-03-13 RX ORDER — ACETAMINOPHEN 500 MG
1000 TABLET ORAL ONCE
Refills: 0 | Status: COMPLETED | OUTPATIENT
Start: 2023-03-14 | End: 2023-03-14

## 2023-03-13 RX ORDER — ACETAMINOPHEN 500 MG
1000 TABLET ORAL ONCE
Refills: 0 | Status: COMPLETED | OUTPATIENT
Start: 2023-03-13 | End: 2023-03-13

## 2023-03-13 RX ORDER — OXYCODONE HYDROCHLORIDE 5 MG/1
5 TABLET ORAL EVERY 4 HOURS
Refills: 0 | Status: DISCONTINUED | OUTPATIENT
Start: 2023-03-13 | End: 2023-03-14

## 2023-03-13 RX ORDER — POLYETHYLENE GLYCOL 3350 17 G/17G
17 POWDER, FOR SOLUTION ORAL DAILY
Refills: 0 | Status: DISCONTINUED | OUTPATIENT
Start: 2023-03-13 | End: 2023-03-14

## 2023-03-13 RX ORDER — FENTANYL CITRATE 50 UG/ML
25 INJECTION INTRAVENOUS ONCE
Refills: 0 | Status: DISCONTINUED | OUTPATIENT
Start: 2023-03-13 | End: 2023-03-13

## 2023-03-13 RX ORDER — KETOROLAC TROMETHAMINE 30 MG/ML
30 SYRINGE (ML) INJECTION ONCE
Refills: 0 | Status: DISCONTINUED | OUTPATIENT
Start: 2023-03-13 | End: 2023-03-13

## 2023-03-13 RX ORDER — KETOROLAC TROMETHAMINE 30 MG/ML
15 SYRINGE (ML) INJECTION ONCE
Refills: 0 | Status: DISCONTINUED | OUTPATIENT
Start: 2023-03-13 | End: 2023-03-13

## 2023-03-13 RX ORDER — SENNA PLUS 8.6 MG/1
2 TABLET ORAL AT BEDTIME
Refills: 0 | Status: DISCONTINUED | OUTPATIENT
Start: 2023-03-13 | End: 2023-03-14

## 2023-03-13 RX ORDER — IBUPROFEN 200 MG
600 TABLET ORAL EVERY 6 HOURS
Refills: 0 | Status: DISCONTINUED | OUTPATIENT
Start: 2023-03-14 | End: 2023-03-14

## 2023-03-13 RX ADMIN — SENNA PLUS 2 TABLET(S): 8.6 TABLET ORAL at 21:25

## 2023-03-13 RX ADMIN — FENTANYL CITRATE 25 MICROGRAM(S): 50 INJECTION INTRAVENOUS at 04:50

## 2023-03-13 RX ADMIN — FENTANYL CITRATE 25 MICROGRAM(S): 50 INJECTION INTRAVENOUS at 06:38

## 2023-03-13 RX ADMIN — Medication 400 MILLIGRAM(S): at 21:28

## 2023-03-13 RX ADMIN — FENTANYL CITRATE 25 MICROGRAM(S): 50 INJECTION INTRAVENOUS at 01:10

## 2023-03-13 RX ADMIN — Medication 400 MILLIGRAM(S): at 09:19

## 2023-03-13 RX ADMIN — Medication 1000 MILLIGRAM(S): at 09:35

## 2023-03-13 RX ADMIN — Medication 30 MILLIGRAM(S): at 09:35

## 2023-03-13 RX ADMIN — Medication 30 MILLIGRAM(S): at 09:19

## 2023-03-13 RX ADMIN — CHLORHEXIDINE GLUCONATE 1 APPLICATION(S): 213 SOLUTION TOPICAL at 13:25

## 2023-03-13 RX ADMIN — FENTANYL CITRATE 25 MICROGRAM(S): 50 INJECTION INTRAVENOUS at 06:55

## 2023-03-13 RX ADMIN — OXYCODONE HYDROCHLORIDE 5 MILLIGRAM(S): 5 TABLET ORAL at 19:06

## 2023-03-13 RX ADMIN — OXYCODONE HYDROCHLORIDE 5 MILLIGRAM(S): 5 TABLET ORAL at 19:36

## 2023-03-13 RX ADMIN — OXYCODONE HYDROCHLORIDE 5 MILLIGRAM(S): 5 TABLET ORAL at 13:26

## 2023-03-13 RX ADMIN — Medication 400 MILLIGRAM(S): at 16:36

## 2023-03-13 RX ADMIN — FENTANYL CITRATE 25 MICROGRAM(S): 50 INJECTION INTRAVENOUS at 01:27

## 2023-03-13 RX ADMIN — Medication 1000 MILLIGRAM(S): at 16:51

## 2023-03-13 RX ADMIN — FENTANYL CITRATE 25 MICROGRAM(S): 50 INJECTION INTRAVENOUS at 04:24

## 2023-03-13 RX ADMIN — Medication 1000 MILLIGRAM(S): at 22:00

## 2023-03-13 RX ADMIN — OXYCODONE HYDROCHLORIDE 5 MILLIGRAM(S): 5 TABLET ORAL at 23:38

## 2023-03-13 NOTE — PROGRESS NOTE ADULT - SUBJECTIVE AND OBJECTIVE BOX
INTERVAL HPI/OVERNIGHT EVENTS:       PRESSORS: [ ] YES [ ] NO  WHICH:    ANTIBIOTICS:                  DATE STARTED:  ANTIBIOTICS:                  DATE STARTED:    Antimicrobial:    Cardiovascular:    Pulmonary:    Hematalogic:    Other:  chlorhexidine 2% Cloths 1 Application(s) Topical daily  fentaNYL    Injectable 25 MICROGram(s) IV Push every 6 hours PRN  ondansetron Injectable 4 milliGRAM(s) IV Push every 4 hours PRN  sodium chloride 0.9% lock flush 10 milliLiter(s) IV Push every 1 hour PRN    chlorhexidine 2% Cloths 1 Application(s) Topical daily  fentaNYL    Injectable 25 MICROGram(s) IV Push every 6 hours PRN  ondansetron Injectable 4 milliGRAM(s) IV Push every 4 hours PRN  sodium chloride 0.9% lock flush 10 milliLiter(s) IV Push every 1 hour PRN    Drug Dosing Weight  Height (cm): 152.4 (11 Mar 2023 06:46)  Weight (kg): 52.2 (10 Mar 2023 18:07)  BMI (kg/m2): 22.5 (11 Mar 2023 06:46)  BSA (m2): 1.48 (11 Mar 2023 06:46)    PHYSICAL EXAM:  GENERAL: NAD  EYES: EOMI, PERRLA  NECK: Supple, No JVD; Normal thyroid; Trachea midline: No LAD   NERVOUS SYSTEM:  Alert & Oriented X3,  Motor Strength 5/5 B/L upper and lower extremities; DTRs 2+ intact and symmetric  CHEST/LUNG: No rales, rhonchi, wheezing, breath sounds present bilaterally  HEART: Regular rate and rhythm; No murmurs, no gallops  ABDOMEN: Soft, Nontender, Nondistended; Bowel sounds present, no pain or masses on palpation  : voiding well, Hairston in place  EXTREMITIES:  2+ Peripheral Pulses, No clubbing, cyanosis, or edema  SKIN: warm, intact, no lesions     LINES/DRAINS/DEVICES  CENTRAL LINE: [ ] YES [ ] NO  LOCATION:     HAIRSTON: [ ] YES [ ] NO     A-LINE:  [ ] YES [ ] NO  LOCATION:       ICU Vital Signs Last 24 Hrs  T(C): 36.8 (13 Mar 2023 04:00), Max: 37.3 (12 Mar 2023 20:00)  T(F): 98.2 (13 Mar 2023 04:00), Max: 99.1 (12 Mar 2023 20:00)  HR: 58 (13 Mar 2023 07:00) (56 - 105)  BP: 106/54 (12 Mar 2023 16:00) (97/55 - 118/68)  BP(mean): 65 (12 Mar 2023 16:00) (65 - 86)  ABP: 96/75 (13 Mar 2023 07:00) (96/75 - 137/77)  ABP(mean): 85 (13 Mar 2023 07:00) (68 - 102)  RR: 10 (13 Mar 2023 07:00) (9 - 24)  SpO2: 98% (13 Mar 2023 07:00) (93% - 100%)    O2 Parameters below as of 12 Mar 2023 20:00  Patient On (Oxygen Delivery Method): nasal cannula  O2 Flow (L/min): 2                03-12 @ 07:01  -  03-13 @ 07:00  --------------------------------------------------------  IN: 450 mL / OUT: 1405 mL / NET: -955 mL              LABS:  CBC Full  -  ( 13 Mar 2023 04:36 )  WBC Count : 16.90 K/uL  RBC Count : 4.32 M/uL  Hemoglobin : 10.9 g/dL  Hematocrit : 33.9 %  Platelet Count - Automated : 237 K/uL  Mean Cell Volume : 78.5 fl  Mean Cell Hemoglobin : 25.2 pg  Mean Cell Hemoglobin Concentration : 32.2 gm/dL  Auto Neutrophil # : x  Auto Lymphocyte # : x  Auto Monocyte # : x  Auto Eosinophil # : x  Auto Basophil # : x  Auto Neutrophil % : x  Auto Lymphocyte % : x  Auto Monocyte % : x  Auto Eosinophil % : x  Auto Basophil % : x    03-13    139  |  109<H>  |  7   ----------------------------<  110<H>  4.1   |  22  |  0.47<L>    Ca    7.7<L>      13 Mar 2023 04:36  Phos  3.7     03-13  Mg     2.2     03-13    TPro  4.7<L>  /  Alb  1.9<L>  /  TBili  0.3  /  DBili  x   /  AST  18  /  ALT  14  /  AlkPhos  58  03-12    PT/INR - ( 12 Mar 2023 19:34 )   PT: 11.9 sec;   INR: 1.00 ratio         PTT - ( 12 Mar 2023 19:34 )  PTT:23.0 sec        RADIOLOGY & ADDITIONAL STUDIES REVIEWED DURING TEAM ROUNDS    [ ]GOALS OF CARE DISCUSSION WITH PATIENT/FAMILY/PROXY:    CRITICAL CARE TIME SPENT: 35 minutes   INTERVAL HPI/OVERNIGHT EVENTS:       PRESSORS: [ ] YES [ ] NO  WHICH:    ANTIBIOTICS:                  DATE STARTED:  ANTIBIOTICS:                  DATE STARTED:    Antimicrobial:    Cardiovascular:    Pulmonary:    Hematalogic:    Other:  chlorhexidine 2% Cloths 1 Application(s) Topical daily  fentaNYL    Injectable 25 MICROGram(s) IV Push every 6 hours PRN  ondansetron Injectable 4 milliGRAM(s) IV Push every 4 hours PRN  sodium chloride 0.9% lock flush 10 milliLiter(s) IV Push every 1 hour PRN    chlorhexidine 2% Cloths 1 Application(s) Topical daily  fentaNYL    Injectable 25 MICROGram(s) IV Push every 6 hours PRN  ondansetron Injectable 4 milliGRAM(s) IV Push every 4 hours PRN  sodium chloride 0.9% lock flush 10 milliLiter(s) IV Push every 1 hour PRN    Drug Dosing Weight  Height (cm): 152.4 (11 Mar 2023 06:46)  Weight (kg): 52.2 (10 Mar 2023 18:07)  BMI (kg/m2): 22.5 (11 Mar 2023 06:46)  BSA (m2): 1.48 (11 Mar 2023 06:46)    PHYSICAL EXAM:  GENERAL: NAD  EYES: EOMI, PERRLA  NECK: Supple, No JVD; Normal thyroid; Trachea midline: No LAD   NERVOUS SYSTEM:  Alert & Oriented X3,  Motor Strength 5/5 B/L upper and lower extremities; DTRs 2+ intact and symmetric  CHEST/LUNG: No rales, rhonchi, wheezing, breath sounds present bilaterally  HEART: Regular rate and rhythm; No murmurs, no gallops  ABDOMEN: Soft, + tender, Nondistended; Bowel sounds present, no pain or masses on palpation  : voiding well, Hairston in place  EXTREMITIES:  2+ Peripheral Pulses, No clubbing, cyanosis, or edema  SKIN: warm, intact, no lesions     LINES/DRAINS/DEVICES  CENTRAL LINE: [ ] YES [ ] NO  LOCATION:     HAIRSTON: [ ] YES [ ] NO     A-LINE:  [ ] YES [ ] NO  LOCATION:       ICU Vital Signs Last 24 Hrs  T(C): 36.8 (13 Mar 2023 04:00), Max: 37.3 (12 Mar 2023 20:00)  T(F): 98.2 (13 Mar 2023 04:00), Max: 99.1 (12 Mar 2023 20:00)  HR: 58 (13 Mar 2023 07:00) (56 - 105)  BP: 106/54 (12 Mar 2023 16:00) (97/55 - 118/68)  BP(mean): 65 (12 Mar 2023 16:00) (65 - 86)  ABP: 96/75 (13 Mar 2023 07:00) (96/75 - 137/77)  ABP(mean): 85 (13 Mar 2023 07:00) (68 - 102)  RR: 10 (13 Mar 2023 07:00) (9 - 24)  SpO2: 98% (13 Mar 2023 07:00) (93% - 100%)    O2 Parameters below as of 12 Mar 2023 20:00  Patient On (Oxygen Delivery Method): nasal cannula  O2 Flow (L/min): 2                03-12 @ 07:01  -  03-13 @ 07:00  --------------------------------------------------------  IN: 450 mL / OUT: 1405 mL / NET: -955 mL              LABS:  CBC Full  -  ( 13 Mar 2023 04:36 )  WBC Count : 16.90 K/uL  RBC Count : 4.32 M/uL  Hemoglobin : 10.9 g/dL  Hematocrit : 33.9 %  Platelet Count - Automated : 237 K/uL  Mean Cell Volume : 78.5 fl  Mean Cell Hemoglobin : 25.2 pg  Mean Cell Hemoglobin Concentration : 32.2 gm/dL  Auto Neutrophil # : x  Auto Lymphocyte # : x  Auto Monocyte # : x  Auto Eosinophil # : x  Auto Basophil # : x  Auto Neutrophil % : x  Auto Lymphocyte % : x  Auto Monocyte % : x  Auto Eosinophil % : x  Auto Basophil % : x    03-13    139  |  109<H>  |  7   ----------------------------<  110<H>  4.1   |  22  |  0.47<L>    Ca    7.7<L>      13 Mar 2023 04:36  Phos  3.7     03-13  Mg     2.2     03-13    TPro  4.7<L>  /  Alb  1.9<L>  /  TBili  0.3  /  DBili  x   /  AST  18  /  ALT  14  /  AlkPhos  58  03-12    PT/INR - ( 12 Mar 2023 19:34 )   PT: 11.9 sec;   INR: 1.00 ratio         PTT - ( 12 Mar 2023 19:34 )  PTT:23.0 sec        RADIOLOGY & ADDITIONAL STUDIES REVIEWED DURING TEAM ROUNDS    [ ]GOALS OF CARE DISCUSSION WITH PATIENT/FAMILY/PROXY:    CRITICAL CARE TIME SPENT: 35 minutes   INTERVAL HPI/OVERNIGHT EVENTS: No acute events overnight, patient received fentanyl as needed for pain.  Patient examined at bedside this AM with her  at bedside and  used (ID: [id number], Name: Iliana).  Patient tolerating her clear liquid diet well.  patient is passing gas, has not had a BM since Friday evening.  Will remove CVC and A-line today and attempt TOV.  Patient hemodyanmiclly stable for downgrade to medical floor      PRESSORS: [ ] YES [X] NO  WHICH:    ANTIBIOTICS:                  DATE STARTED:  ANTIBIOTICS:                  DATE STARTED:    Antimicrobial:    Cardiovascular:    Pulmonary:    Hematalogic:    Other:  chlorhexidine 2% Cloths 1 Application(s) Topical daily  fentaNYL    Injectable 25 MICROGram(s) IV Push every 6 hours PRN  ondansetron Injectable 4 milliGRAM(s) IV Push every 4 hours PRN  sodium chloride 0.9% lock flush 10 milliLiter(s) IV Push every 1 hour PRN    chlorhexidine 2% Cloths 1 Application(s) Topical daily  fentaNYL    Injectable 25 MICROGram(s) IV Push every 6 hours PRN  ondansetron Injectable 4 milliGRAM(s) IV Push every 4 hours PRN  sodium chloride 0.9% lock flush 10 milliLiter(s) IV Push every 1 hour PRN    Drug Dosing Weight  Height (cm): 152.4 (11 Mar 2023 06:46)  Weight (kg): 52.2 (10 Mar 2023 18:07)  BMI (kg/m2): 22.5 (11 Mar 2023 06:46)  BSA (m2): 1.48 (11 Mar 2023 06:46)    PHYSICAL EXAM:  GENERAL: NAD  EYES: EOMI, PERRLA  NECK: Supple, No JVD; Normal thyroid; Trachea midline: No LAD   NERVOUS SYSTEM:  Alert & Oriented X3,  Motor Strength 5/5 B/L upper and lower extremities; DTRs 2+ intact and symmetric  CHEST/LUNG: No rales, rhonchi, wheezing, breath sounds present bilaterally  HEART: Regular rate and rhythm; No murmurs, no gallops  ABDOMEN: Soft, + tender, Nondistended; Bowel sounds present, no pain or masses on palpation  : voiding well, Hairston in place  EXTREMITIES:  2+ Peripheral Pulses, No clubbing, cyanosis, or edema  SKIN: warm, intact, no lesions     LINES/DRAINS/DEVICES  CENTRAL LINE: [ ] YES [ ] NO  LOCATION:     HAIRSTON: [ ] YES [ ] NO     A-LINE:  [ ] YES [ ] NO  LOCATION:       ICU Vital Signs Last 24 Hrs  T(C): 36.8 (13 Mar 2023 04:00), Max: 37.3 (12 Mar 2023 20:00)  T(F): 98.2 (13 Mar 2023 04:00), Max: 99.1 (12 Mar 2023 20:00)  HR: 58 (13 Mar 2023 07:00) (56 - 105)  BP: 106/54 (12 Mar 2023 16:00) (97/55 - 118/68)  BP(mean): 65 (12 Mar 2023 16:00) (65 - 86)  ABP: 96/75 (13 Mar 2023 07:00) (96/75 - 137/77)  ABP(mean): 85 (13 Mar 2023 07:00) (68 - 102)  RR: 10 (13 Mar 2023 07:00) (9 - 24)  SpO2: 98% (13 Mar 2023 07:00) (93% - 100%)    O2 Parameters below as of 12 Mar 2023 20:00  Patient On (Oxygen Delivery Method): nasal cannula  O2 Flow (L/min): 2                03-12 @ 07:01  -  03-13 @ 07:00  --------------------------------------------------------  IN: 450 mL / OUT: 1405 mL / NET: -955 mL              LABS:  CBC Full  -  ( 13 Mar 2023 04:36 )  WBC Count : 16.90 K/uL  RBC Count : 4.32 M/uL  Hemoglobin : 10.9 g/dL  Hematocrit : 33.9 %  Platelet Count - Automated : 237 K/uL  Mean Cell Volume : 78.5 fl  Mean Cell Hemoglobin : 25.2 pg  Mean Cell Hemoglobin Concentration : 32.2 gm/dL  Auto Neutrophil # : x  Auto Lymphocyte # : x  Auto Monocyte # : x  Auto Eosinophil # : x  Auto Basophil # : x  Auto Neutrophil % : x  Auto Lymphocyte % : x  Auto Monocyte % : x  Auto Eosinophil % : x  Auto Basophil % : x    03-13    139  |  109<H>  |  7   ----------------------------<  110<H>  4.1   |  22  |  0.47<L>    Ca    7.7<L>      13 Mar 2023 04:36  Phos  3.7     03-13  Mg     2.2     03-13    TPro  4.7<L>  /  Alb  1.9<L>  /  TBili  0.3  /  DBili  x   /  AST  18  /  ALT  14  /  AlkPhos  58  03-12    PT/INR - ( 12 Mar 2023 19:34 )   PT: 11.9 sec;   INR: 1.00 ratio         PTT - ( 12 Mar 2023 19:34 )  PTT:23.0 sec        RADIOLOGY & ADDITIONAL STUDIES REVIEWED DURING TEAM ROUNDS    [ ]GOALS OF CARE DISCUSSION WITH PATIENT/FAMILY/PROXY:    CRITICAL CARE TIME SPENT: 35 minutes   INTERVAL HPI/OVERNIGHT EVENTS: No acute events overnight, patient received fentanyl as needed for pain.  Patient examined at bedside this AM with her  at bedside and  used (ID: [id number], Name: Iliana).  Patient complaing of 5/10 b/l lower abdominal pain.  Patient tolerating her clear liquid diet well.  patient is passing gas, has not had a BM since Friday evening.  Will remove CVC and A-line today and attempt TOV.  Patient hemodynamically stable for downgrade to medical floor.      PRESSORS: [ ] YES [X] NO  WHICH:    ANTIBIOTICS:                  DATE STARTED:  ANTIBIOTICS:                  DATE STARTED:    Antimicrobial:    Cardiovascular:    Pulmonary:    Hematalogic:    Other:  chlorhexidine 2% Cloths 1 Application(s) Topical daily  fentaNYL    Injectable 25 MICROGram(s) IV Push every 6 hours PRN  ondansetron Injectable 4 milliGRAM(s) IV Push every 4 hours PRN  sodium chloride 0.9% lock flush 10 milliLiter(s) IV Push every 1 hour PRN    chlorhexidine 2% Cloths 1 Application(s) Topical daily  fentaNYL    Injectable 25 MICROGram(s) IV Push every 6 hours PRN  ondansetron Injectable 4 milliGRAM(s) IV Push every 4 hours PRN  sodium chloride 0.9% lock flush 10 milliLiter(s) IV Push every 1 hour PRN    Drug Dosing Weight  Height (cm): 152.4 (11 Mar 2023 06:46)  Weight (kg): 52.2 (10 Mar 2023 18:07)  BMI (kg/m2): 22.5 (11 Mar 2023 06:46)  BSA (m2): 1.48 (11 Mar 2023 06:46)    PHYSICAL EXAM:  GENERAL: pale, Nervous appearing, hesitant to move neck due to Cordis in RIJ  HEAD:  Atraumatic, Normocephalic  EYES:  conjunctiva and sclera clear  NECK: Supple, No JVD, Normal thyroid  NERVOUS SYSTEM:  Alert & Oriented X3,  Motor Strength 5/5 B/L upper and lower extremities; DTRs 2+ intact and symmetric  CHEST/LUNG: Clear to percussion bilaterally; No rales, rhonchi, wheezing, or rubs  HEART: Regular rate and rhythm; No murmurs, rubs, or gallops  ABDOMEN: LLQ abdominal tenderness   EXTREMITIES:  2+ Peripheral Pulses, No clubbing, cyanosis, or edema  LYMPH: No lymphadenopathy noted  SKIN: No rashes or     LINES/DRAINS/DEVICES  CENTRAL LINE: [ ] YES [ ] NO  LOCATION:     HAIRSTON: [ ] YES [ ] NO     A-LINE:  [ ] YES [ ] NO  LOCATION:       ICU Vital Signs Last 24 Hrs  T(C): 36.8 (13 Mar 2023 04:00), Max: 37.3 (12 Mar 2023 20:00)  T(F): 98.2 (13 Mar 2023 04:00), Max: 99.1 (12 Mar 2023 20:00)  HR: 58 (13 Mar 2023 07:00) (56 - 105)  BP: 106/54 (12 Mar 2023 16:00) (97/55 - 118/68)  BP(mean): 65 (12 Mar 2023 16:00) (65 - 86)  ABP: 96/75 (13 Mar 2023 07:00) (96/75 - 137/77)  ABP(mean): 85 (13 Mar 2023 07:00) (68 - 102)  RR: 10 (13 Mar 2023 07:00) (9 - 24)  SpO2: 98% (13 Mar 2023 07:00) (93% - 100%)    O2 Parameters below as of 12 Mar 2023 20:00  Patient On (Oxygen Delivery Method): nasal cannula  O2 Flow (L/min): 2                03-12 @ 07:01  -  03-13 @ 07:00  --------------------------------------------------------  IN: 450 mL / OUT: 1405 mL / NET: -955 mL              LABS:  CBC Full  -  ( 13 Mar 2023 04:36 )  WBC Count : 16.90 K/uL  RBC Count : 4.32 M/uL  Hemoglobin : 10.9 g/dL  Hematocrit : 33.9 %  Platelet Count - Automated : 237 K/uL  Mean Cell Volume : 78.5 fl  Mean Cell Hemoglobin : 25.2 pg  Mean Cell Hemoglobin Concentration : 32.2 gm/dL  Auto Neutrophil # : x  Auto Lymphocyte # : x  Auto Monocyte # : x  Auto Eosinophil # : x  Auto Basophil # : x  Auto Neutrophil % : x  Auto Lymphocyte % : x  Auto Monocyte % : x  Auto Eosinophil % : x  Auto Basophil % : x    03-13    139  |  109<H>  |  7   ----------------------------<  110<H>  4.1   |  22  |  0.47<L>    Ca    7.7<L>      13 Mar 2023 04:36  Phos  3.7     03-13  Mg     2.2     03-13    TPro  4.7<L>  /  Alb  1.9<L>  /  TBili  0.3  /  DBili  x   /  AST  18  /  ALT  14  /  AlkPhos  58  03-12    PT/INR - ( 12 Mar 2023 19:34 )   PT: 11.9 sec;   INR: 1.00 ratio         PTT - ( 12 Mar 2023 19:34 )  PTT:23.0 sec        RADIOLOGY & ADDITIONAL STUDIES REVIEWED DURING TEAM ROUNDS    [ ]GOALS OF CARE DISCUSSION WITH PATIENT/FAMILY/PROXY:    CRITICAL CARE TIME SPENT: 35 minutes   INTERVAL HPI/OVERNIGHT EVENTS: No acute events overnight, patient received fentanyl as needed for pain.  Patient examined at bedside this AM with her  at bedside and  used (ID: [id number], Name: Iliana).  Patient complaing of 5/10 b/l lower abdominal pain.  Patient tolerating her clear liquid diet well.  patient is passing gas, has not had a BM since Friday evening.  Will remove CVC and A-line today and attempt TOV.  Patient hemodynamically stable for downgrade to medical floor.      PRESSORS: [ ] YES [X] NO  WHICH:    ANTIBIOTICS:                  DATE STARTED:  ANTIBIOTICS:                  DATE STARTED:    Antimicrobial:    Cardiovascular:    Pulmonary:    Hematalogic:    Other:  chlorhexidine 2% Cloths 1 Application(s) Topical daily  fentaNYL    Injectable 25 MICROGram(s) IV Push every 6 hours PRN  ondansetron Injectable 4 milliGRAM(s) IV Push every 4 hours PRN  sodium chloride 0.9% lock flush 10 milliLiter(s) IV Push every 1 hour PRN    chlorhexidine 2% Cloths 1 Application(s) Topical daily  fentaNYL    Injectable 25 MICROGram(s) IV Push every 6 hours PRN  ondansetron Injectable 4 milliGRAM(s) IV Push every 4 hours PRN  sodium chloride 0.9% lock flush 10 milliLiter(s) IV Push every 1 hour PRN    Drug Dosing Weight  Height (cm): 152.4 (11 Mar 2023 06:46)  Weight (kg): 52.2 (10 Mar 2023 18:07)  BMI (kg/m2): 22.5 (11 Mar 2023 06:46)  BSA (m2): 1.48 (11 Mar 2023 06:46)    PHYSICAL EXAM:  GENERAL: pale, Nervous appearing, hesitant to move neck due to Cordis in RIJ  HEAD:  Atraumatic, Normocephalic  EYES:  conjunctiva and sclera clear  NECK: Supple, No JVD, Normal thyroid  NERVOUS SYSTEM:  Alert & Oriented X3,  Motor Strength 5/5 B/L upper and lower extremities; DTRs 2+ intact and symmetric  CHEST/LUNG: Clear to percussion bilaterally; No rales, rhonchi, wheezing, or rubs  HEART: Regular rate and rhythm; No murmurs, rubs, or gallops  ABDOMEN: LLQ abdominal tenderness   EXTREMITIES:  2+ Peripheral Pulses, No clubbing, cyanosis, or edema  LYMPH: No lymphadenopathy noted  SKIN: No rashes or     LINES/DRAINS/DEVICES  CENTRAL LINE: [X] YES [ ] NO  LOCATION:   Right IJ  HAIRSTON: [X] YES [ ] NO     A-LINE:  [X] YES [ ] NO  LOCATION:   Left Radial    ICU Vital Signs Last 24 Hrs  T(C): 36.8 (13 Mar 2023 04:00), Max: 37.3 (12 Mar 2023 20:00)  T(F): 98.2 (13 Mar 2023 04:00), Max: 99.1 (12 Mar 2023 20:00)  HR: 58 (13 Mar 2023 07:00) (56 - 105)  BP: 106/54 (12 Mar 2023 16:00) (97/55 - 118/68)  BP(mean): 65 (12 Mar 2023 16:00) (65 - 86)  ABP: 96/75 (13 Mar 2023 07:00) (96/75 - 137/77)  ABP(mean): 85 (13 Mar 2023 07:00) (68 - 102)  RR: 10 (13 Mar 2023 07:00) (9 - 24)  SpO2: 98% (13 Mar 2023 07:00) (93% - 100%)    O2 Parameters below as of 12 Mar 2023 20:00  Patient On (Oxygen Delivery Method): nasal cannula  O2 Flow (L/min): 2                03-12 @ 07:01  -  03-13 @ 07:00  --------------------------------------------------------  IN: 450 mL / OUT: 1405 mL / NET: -955 mL              LABS:  CBC Full  -  ( 13 Mar 2023 04:36 )  WBC Count : 16.90 K/uL  RBC Count : 4.32 M/uL  Hemoglobin : 10.9 g/dL  Hematocrit : 33.9 %  Platelet Count - Automated : 237 K/uL  Mean Cell Volume : 78.5 fl  Mean Cell Hemoglobin : 25.2 pg  Mean Cell Hemoglobin Concentration : 32.2 gm/dL  Auto Neutrophil # : x  Auto Lymphocyte # : x  Auto Monocyte # : x  Auto Eosinophil # : x  Auto Basophil # : x  Auto Neutrophil % : x  Auto Lymphocyte % : x  Auto Monocyte % : x  Auto Eosinophil % : x  Auto Basophil % : x    03-13    139  |  109<H>  |  7   ----------------------------<  110<H>  4.1   |  22  |  0.47<L>    Ca    7.7<L>      13 Mar 2023 04:36  Phos  3.7     03-13  Mg     2.2     03-13    TPro  4.7<L>  /  Alb  1.9<L>  /  TBili  0.3  /  DBili  x   /  AST  18  /  ALT  14  /  AlkPhos  58  03-12    PT/INR - ( 12 Mar 2023 19:34 )   PT: 11.9 sec;   INR: 1.00 ratio         PTT - ( 12 Mar 2023 19:34 )  PTT:23.0 sec        RADIOLOGY & ADDITIONAL STUDIES REVIEWED DURING TEAM ROUNDS    [ ]GOALS OF CARE DISCUSSION WITH PATIENT/FAMILY/PROXY:    CRITICAL CARE TIME SPENT: 35 minutes

## 2023-03-13 NOTE — CONSULT NOTE ADULT - ASSESSMENT
Confidential Drug Utilization Report  Search Terms: David loyd, 1990Search Date: 03/13/2023 09:20:16 AM  The Drug Utilization Report below displays all of the controlled substance prescriptions, if any, that your patient has filled in the last twelve months. The information displayed on this report is compiled from pharmacy submissions to the Department, and accurately reflects the information as submitted by the pharmacies.    This report was requested by: Elizabeth Fernando | Reference #: [id number]    There are no results for the search terms that you entered.

## 2023-03-13 NOTE — CHART NOTE - NSCHARTNOTESELECT_GEN_ALL_CORE
Event Note
ICU Downgrade to OB/GYN Service/Transfer Note
Event Note
Possible Transfusion Reaction/Event Note

## 2023-03-13 NOTE — CHART NOTE - NSCHARTNOTEFT_GEN_A_CORE
Patient is a 32 year old Female with no past medical history who presents  with sharp 10 out of 10 abdominal pain with accompanying nausea.   Patient was taken to the OR on 3/11 for  laparotomy with left salpingectomy secondary to ectopic pregnancy. Patient than started complainng of severe left abdominal pain with drop in hemoglobin.  She was admitted to ICU for possible intraabdominal bleeding.    ICU Course:  Patient Hb was found to be 5.8, 2U of PRBC were tansfused.  After compeltion of the second unit she started complaing of difficulty breathing, itchy skin, and itchy throught.  She was treated for anaphyslis with Epinephrine, Dexamethasone, and benadryl.  Patient was Hemodynamiclly stable post treatment.  She was taken to the OR by OB/GYN.  She returned to the ICU s/p procedure.  There were no signs of active bleeding during the procedure.  Patients H/H was trended and remained stable.  Patient was treated for her pain and pain management was consulted, recommendtions were followed. Patients CVC and RAL were removed.    Patient is currently hemodyanmiclly stable for downgrade to the OB/GYN service.  Signed out to OB/GYN MARTA Peres.  Patient and Patients  informed at bedside.    Things to follow:  - Antibiotics per ob/gyn team  - F/U Pain management recs  - Trend H/H  - Continue advancing diet as tolerated  - DVT PPX per surgical team  - advance activity as tolerated as per surgery team Patient is a 32 year old Female with no past medical history who presents  with sharp 10 out of 10 abdominal pain with accompanying nausea.   Patient was taken to the OR on 3/11 for  laparotomy with left salpingectomy secondary to ectopic pregnancy. Patient than started complainng of severe left abdominal pain with drop in hemoglobin.  She was admitted to ICU for possible intraabdominal bleeding.    ICU Course:  Patient Hb was found to be 5.8, 2U of PRBC were tansfused.  After compeltion of the second unit she started complaing of difficulty breathing, itchy skin, and itchy throught.  She was treated for anaphyslis with Epinephrine, Dexamethasone, and benadryl.  Patient was Hemodynamiclly stable post treatment.  She was taken to the OR by OB/GYN.  She returned to the ICU s/p procedure.  There were no signs of active bleeding during the procedure.  Patients H/H was trended and remained stable.  Patient was treated for her pain and pain management was consulted, recommendtions were followed. Patients CVC and RAL were removed.    Patient is currently hemodyanmiclly stable for downgrade to the OB/GYN service.  Signed out to OB/GYN MARTA Peres.  Patient and Patients  informed at bedside.    Things to follow:  - Antibiotics per ob/gyn team  - TOV Today (Odom Removed at 1pm)  - F/U Pain management recs  - Trend H/H  - Continue advancing diet as tolerated  - DVT PPX per surgical team  - advance activity as tolerated as per surgery team Patient is a 32 year old Female with no past medical history who presents  with sharp 10 out of 10 abdominal pain with accompanying nausea.   Patient was taken to the OR on 3/11 for  laparotomy with left salpingectomy secondary to ectopic pregnancy. Patient than started complainng of severe left abdominal pain with drop in hemoglobin.  She was admitted to ICU for possible intraabdominal bleeding.    ICU Course:  Patient Hb was found to be 5.8, 2U of PRBC were tansfused.  After compeltion of the second unit she started complaing of difficulty breathing, itchy skin, and itchy throught.  She was treated for anaphyslis with Epinephrine, Dexamethasone, and benadryl.  Patient was Hemodynamiclly stable post treatment.  She was taken to the OR by OB/GYN.  She returned to the ICU s/p procedure.  There were no signs of active bleeding during the procedure.  Patients H/H was trended and remained stable.  Patient was treated for her pain and pain management was consulted, recommendtions were followed. Patients CVC and RAL were removed.    Patient is currently hemodyanmiclly stable for downgrade to the OB/GYN service.  Signed out to OB/GYN MARTA Peres.  Patient and Patients  informed at bedside.    Things to follow:  - Antibiotics per ob/gyn team  - TOV Today (Odom Removed at 1pm), Bladder Scan @8PM  - F/U Pain management recs  - Trend H/H  - Continue advancing diet as tolerated  - DVT PPX per surgical team  - advance activity as tolerated as per surgery team

## 2023-03-13 NOTE — CONSULT NOTE ADULT - PROBLEM SELECTOR RECOMMENDATION 9
Pt with acute lower abdominal pain which is somatic in nature due s/p mini laparotomy, left salpingectomy, and IUD removal for left ectopic pregnancy on 3/11, POD #2.   Opioid pain recommendations   - Discontinue PRN fentanyl IV.  - Oxycodone 5 mg PO q 4 hours PRN severe pain. Monitor for sedation/ respiratory depression.   Non-opioid pain recommendations   - NSAIDs per surgical team.   - Acetaminophen 1 gram IV q 6 hours for 24 hours only, then 1G PO q8h PRN moderate pain. Monitor LFTs  Bowel Regimen  - Miralax 17G PO daily  - Senna 2 tablets at bedtime for constipation  Mild pain   - Non-pharmacological pain treatment recommendations  - Warm/ Cool packs PRN   - Repositioning,, imagery, relaxation, distraction.  - Physical therapy OOB if no contraindications   Recommendations discussed with primary team and RN

## 2023-03-13 NOTE — CONSULT NOTE ADULT - SUBJECTIVE AND OBJECTIVE BOX
Source of information: MITZI FATIMA, Chart review  Patient language: Lebanese  : Eliezer # [id number]    HPI:  31 yo F [facility] sharp 10 out of 10 abdominal pain beginning Tuesday with accompanying nausea beginning today. Patient's LMP 2023. Hcg positive, US findings concerning for left ectopic adnexal pregnancy.Patient has IUD in situ. Patient denies vaginal bleeding, denies vomiting, diarrhea, SOB or chest pain. Patient denies fevers or chills. Reports one sexual partner.    ob/gynhx: , primary c/s in 2016, for LGA, boy. Denies history of STDs or abnl pap smears. last seen gynecologist  2 years prior for IUD placement. Normal menses.  shx: c/s , uncomplicated  medhx: denies       (10 Mar 2023 22:17)    Pt s/p mini laparotomy, left salpingectomy, and IUD removal for left ectopic pregnancy on 3/11, POD #2. Pain consulted 3/13 for pain recommendations. Per team, pt experienced wheezing, throat itching swelling, difficulty breathing and anxiety after second PRBC transfusion on 3/12. Given concern for possible allergic/anaphylaxis to transfusion, pt was given 0.3 IM Epinephrine, Decadron 5mg IVP, Pepcid 20mg, and Benadryl 50 mg IVP. 1 hour prior to PRBC patient had received Dilaudid for the first time, less likely reaction to opioid given timing.  Pt seen and examined at bedside. ICU resident at bedside. Pt reports lower abdominal pain score 5/10  SCALE USED: (1-10 VNRS). Pt describes pain as pressure, radiating throughout lower abdomen, alleviated by IV pain medication, exacerbated by movement and palpation. States she does not use pain medications at home. Does not recall if she has taken opioids in the past. No opioid prescriptions on Istop review. Denies any known drug allergies. Diet advanced to clears, tolerating well. Denies lethargy, chest pain, SOB, nausea, vomiting, constipation. Reports passing flatus, last BM 3/10. Patient stated goal for pain control: to be able to take deep breaths, get out of bed to chair and ambulate with tolerable pain control. Educated pt on incentive spirometry use. Pt and family member expressed concern of blood type and blood transfusion given. Reassurance and emotional support provided. Questions and concerns addressed.     PAST MEDICAL & SURGICAL HISTORY:  No pertinent past medical history      S/P           FAMILY HISTORY:      Social History:  denies toxic habits x3; denies h/o anx/depression (10 Mar 2023 22:17)   [X ] Denies ETOH use, illicit drug use and smoking    Allergies    No Known Allergies    MEDICATIONS  (STANDING):  acetaminophen   IVPB .. 1000 milliGRAM(s) IV Intermittent once  acetaminophen   IVPB .. 1000 milliGRAM(s) IV Intermittent once  chlorhexidine 2% Cloths 1 Application(s) Topical daily    MEDICATIONS  (PRN):  ondansetron Injectable 4 milliGRAM(s) IV Push every 4 hours PRN Nausea and/or Vomiting  oxyCODONE    IR 5 milliGRAM(s) Oral every 4 hours PRN Severe Pain (7 - 10)  sodium chloride 0.9% lock flush 10 milliLiter(s) IV Push every 1 hour PRN Pre/post blood products, medications, blood draw, and to maintain line patency      Vital Signs Last 24 Hrs  T(C): 36.2 (13 Mar 2023 08:00), Max: 37.3 (12 Mar 2023 20:00)  T(F): 97.2 (13 Mar 2023 08:00), Max: 99.1 (12 Mar 2023 20:00)  HR: 74 (13 Mar 2023 12:00) (56 - 105)  BP: 106/54 (12 Mar 2023 16:00) (97/55 - 118/68)  BP(mean): 65 (12 Mar 2023 16:00) (65 - 86)  RR: 21 (13 Mar 2023 12:00) (9 - 33)  SpO2: 98% (13 Mar 2023 12:00) (95% - 100%)    Parameters below as of 13 Mar 2023 08:00  Patient On (Oxygen Delivery Method): room air        LABS: Reviewed.                          10.9   16.90 )-----------( 237      ( 13 Mar 2023 04:36 )             33.9     03-13    139  |  109<H>  |  7   ----------------------------<  110<H>  4.1   |  22  |  0.47<L>    Ca    7.7<L>      13 Mar 2023 04:36  Phos  3.7     03-13  Mg     2.2     03-13    TPro  4.7<L>  /  Alb  1.9<L>  /  TBili  0.3  /  DBili  x   /  AST  18  /  ALT  14  /  AlkPhos  58  03-12    PT/INR - ( 12 Mar 2023 19:34 )   PT: 11.9 sec;   INR: 1.00 ratio         PTT - ( 12 Mar 2023 19:34 )  PTT:23.0 sec  LIVER FUNCTIONS - ( 12 Mar 2023 15:52 )  Alb: 1.9 g/dL / Pro: 4.7 g/dL / ALK PHOS: 58 U/L / ALT: 14 U/L DA / AST: 18 U/L / GGT: x             CAPILLARY BLOOD GLUCOSE        COVID-19 PCR: NotDetec (10 Mar 2023 21:45)      Radiology: Reviewed.   < from: US Pelvis Complete (US Pelvis Complete .) (03.10.23 @ 20:54) >    ACC: [id number] EXAM:  US TRANSVAGINAL   ORDERED BY: ROJAS CHAVEZ     ACC: [id number] EXAM:  US PELVIC COMPLETE   ORDERED BY: ROJAS CHAVEZ     PROCEDURE DATE:  03/10/2023          INTERPRETATION:  CLINICAL INDICATION: Lower abdominal pain, nausea. 6   weeks 1 day by LMP 2023. Beta-hCG 765.    TECHNIQUE: Transabdominal and transvaginal ultrasound of the pelvis was   performed. Grayscale, color Doppler and spectral Doppler were utilized.    COMPARISON: CT and US 10/12/2016..    FINDINGS:    Uterus: 8.4 x 4.8 x 6.2 cm. Unremarkable.  Endometrium: 1.0 cm. IUD in place. No intrauterine gestation identified.  Right ovary: 2.5 x 0.9 x 2.0 cm. Small follicles. Flow present.  Left ovary: 2.4 x 1.9 x 2.5 cm. A 1.3 x 1.6 cm corpus luteum. Flow   present. A 2.9 x 1.9 x 2.5 cm hypoechoic structure with somewhat tubular   configuration and peripheral vascularity at the left adnexa.  Additional: Trace free fluid with minimal debris..    IMPRESSION:    IUD in place. Findings concerning for left ectopic adnexal pregnancy.   Trace free fluid with minimal debris. Recommend clinical correlation to   assess early rupture.    Discussed with Dr. Chavez.    --- End of Report ---            CHELSEA SHROT MD; Attending Radiologist  This document has been electronically signed. Mar 10 2023  9:06PM    < end of copied text >    < from: US Transvaginal (03.10.23 @ 20:54) >  ACC: [id number] EXAM:  US TRANSVAGINAL   ORDERED BY: ROJAS CHAVEZ     ACC: [id number] EXAM:  US PELVIC COMPLETE   ORDERED BY: ROJAS CHAVEZ     PROCEDURE DATE:  03/10/2023          INTERPRETATION:  CLINICAL INDICATION: Lower abdominal pain, nausea. 6   weeks 1 day by LMP 2023. Beta-hCG 765.    TECHNIQUE: Transabdominal and transvaginal ultrasound of the pelvis was   performed. Grayscale, color Doppler and spectral Doppler were utilized.    COMPARISON: CT and US 10/12/2016..    FINDINGS:    Uterus: 8.4 x 4.8 x 6.2 cm. Unremarkable.  Endometrium: 1.0 cm. IUD in place. No intrauterine gestation identified.  Right ovary: 2.5 x 0.9 x 2.0 cm. Small follicles. Flow present.  Left ovary: 2.4 x 1.9 x 2.5 cm. A 1.3 x 1.6 cm corpus luteum. Flow   present. A 2.9 x 1.9 x 2.5 cm hypoechoic structure with somewhat tubular   configuration and peripheral vascularity at the left adnexa.  Additional: Trace free fluid with minimal debris..    IMPRESSION:    IUD in place. Findings concerning for left ectopic adnexal pregnancy.   Trace free fluid with minimal debris. Recommend clinical correlation to   assess early rupture.    Discussed with Dr. Chavez.    --- End of Report ---            CHELSEA SHORT MD; Attending Radiologist  This document has been electronically signed. Mar 10 2023  9:06PM    < end of copied text >    < from: Xray Chest 1 View-PORTABLE IMMEDIATE (Xray Chest 1 View-PORTABLE IMMEDIATE .) (23 @ 15:29) >    ACC: [id number] EXAM:  XR CHEST PORTABLE IMMED 1V   ORDERED BY: MICHELLE RUELAS     PROCEDURE DATE:  2023          INTERPRETATION:  INDICATION: Hemorrhagic shock. Line placement    Portable chest 2:50 PM    COMPARISON: None    FINDINGS:  Heart/Vascular: The heart size, mediastinum, hilum and aorta are within   normal limits for projection.  Pulmonary: Midline trachea. There is no focal infiltrate, congestion or   effusion.    Bones: There is no fracture.  Lines and catheter: Tip of right IJ introducer sheath in SVC. There is no   pneumothorax.    Impression:    No acute pulmonary disease.    Tip of right IJ introducer sheath in SVC. There is no pneumothorax.    --- End of Report ---             EMELY MARIE DO; Attending Radiologist  This document has been electronically signed. Mar 12 2023  3:36PM    < end of copied text >      ORT Score -   Family Hx of substance abuse	Female	      Male  Alcohol 	                                           1                     3  Illegal drugs	                                   2                     3  Rx drugs                                           4 	                  4  Personal Hx of substance abuse		  Alcohol 	                                          3	                  3  Illegal drugs                                     4	                  4  Rx drugs                                            5 	                  5  Age between 16- 45 years	           1                     1  hx preadolescent sexual abuse	   3 	                  0  Psychological disease		  ADD, OCD, bipolar, schizophrenia   2	          2  Depression                                           1 	          1  Total: 1    a score of 3 or lower indicates low risk for opioid abuse		  a score of 4-7 indicates moderate risk for opioid abuse		  a score of 8 or higher indicates high risk for opioid abuse  	  REVIEW OF SYSTEMS:  CONSTITUTIONAL: No fever + fatigue  HEENT:  No difficulty hearing, no change in vision  NECK: No pain or stiffness  RESPIRATORY: No cough, wheezing, chills or hemoptysis; No shortness of breath  CARDIOVASCULAR: No chest pain, palpitations, dizziness, or leg swelling  GASTROINTESTINAL: No loss of appetite, decreased PO intake. + lower abdominal pain. No nausea, vomiting; No diarrhea or constipation.   GENITOURINARY: No dysuria, frequency, hematuria, retention or incontinence  MUSCULOSKELETAL: No joint pain or swelling; No muscle, back, or extremity pain, no upper motor strength weakness + lower motor strength weakness, no saddle anesthesia, bowel/bladder incontinence, no falls   NEURO: No headaches, No numbness/tingling b/l LE, No weakness    PHYSICAL EXAM:  GENERAL: + anxious, fatigued, Oriented X4, cooperative, NAD, Good concentration. Speech is clear.   RESPIRATORY: Respirations even and unlabored. Clear to auscultation bilaterally; No rales, rhonchi, wheezing, or rubs  CARDIOVASCULAR: Normal S1/S2, regular rate and rhythm; No murmurs, rubs, or gallops. No JVD.   GASTROINTESTINAL:  Soft, + lower abdominal tenderness, Nondistended; Bowel sounds present + lower abdominal surgical dressing c/d/i   GENITOURINARY: + urinary catheter draining clear yellow urine   PERIPHERAL VASCULAR:  Extremities warm without edema. 2+ Peripheral Pulses, No cyanosis, No calf tenderness  MUSCULOSKELETAL: Motor Strength 5/5 B/L upper and 2/5 lower extremities; + decreased ROM b/l lower extremities; No tenderness on palpation of all joints.   SKIN: Warm, dry, intact. No rashes, lesions, scars or wounds.     Risk factors associated with adverse outcomes related to opioid treatment  [ ]  Concurrent benzodiazepine use  [ ]  History/ Active substance use or alcohol use disorder  [ ] Psychiatric co-morbidity  [ ] Sleep apnea  [ ] COPD  [ ] BMI> 35  [ ] Liver dysfunction  [ ] Renal dysfunction  [ ] CHF  [ ] Smoker  [ ]  Age > 60 years    [X ]  [place]  Reviewed and Copied to Chart. See below.    Plan of care and goal oriented pain management treatment options were discussed with patient and /or primary care giver; all questions and concerns were addressed and care was aligned with patient's wishes.    Educated patient on goal oriented pain management treatment options

## 2023-03-13 NOTE — PROGRESS NOTE ADULT - ATTENDING COMMENTS
IMP: This is a 32 yr old woman  with no past medical history presents  [facility] sharp 10 out of 10 abdominal pain beginning Tuesday with accompanying nausea.   Patient was taken to the OR on 3/11 and is POD #1 s/p laparotomy with left salpingectomy secondary to ectopic pregnancy. Patient now is c/o severe left abdominal pain with drop in hemoglobin.  ICU was consulted for possible intraabdominal bleeding.    Assessment:    - Hemorrhagic shock   - Acute post op blood loss  - Intra-abdominal bleeding  - Ectopic pregnancy  - S/p left salpingectomy      Plan     - O2 supp as needed   - Monitor airway for stridor / wheezing   - Pain control   - ? allergic rxn to opoid vs transfusion rxn   - Monitor CBC q8h x 24 hrs   - Diet as per GYN   - No anticoag   - GYN f/u   - OOB to chair   - Incentive spirometry   - Antibx as per GYN team

## 2023-03-13 NOTE — PROGRESS NOTE ADULT - ASSESSMENT
31 yo F with no past medical history presents  [facility] sharp 10 out of 10 abdominal pain beginning Tuesday with accompanying nausea.   Patient was taken to the OR on 3/11 and is POD #1 s/p laparotomy with left salpingectomy secondary to ectopic pregnancy. Patient now is c/o severe left abdominal pain with drop in hemoglobin.  ICU was consulted for possible intraabdominal bleeding.    Assessment:  #Intra-abdominal bleeding  # Ectopic pregnancy  # S/p left salpingectomy      Neuro   - A/O x3, no issues     Cardiovascular   #Hemorraghic shock  - p/w ectopic pregnancy s/p laparotomy with left salpingectomy   -concern for hemorraghic shock 2/2 intraabdominal bleeding  -hb 5.7 (7.2)  - Will give 2 units PRBC, 2FFP, 2plts  - C/W IVF  - Obgyb following- plan for OR today   - f/u CBC post transfusion       Pulmonary    - no issues     Gastrointestinal  #Intraabdominal bleeding   -concern for intraabdominal bleeding s/p left salpingectomy   - plan as above      Renal  -no issues     Endocrine  - no issues     Infectious Disease  - no issues   - monitor for signs of infection post op      Heme  #Anemia  - Concern fo possible intraabdominal bleeding  -Will give 2PRBC, 2FFP, 2 plts  -f/u CBC post transfusion  -plan as above    #Concern for DIC  -f/u DIC panel   -Will give 2PRBC, 2FFP, 2 plts    Skin   - no issues       DVT Prophylaxis: Will hold chemical dvt ppx as pt is bleeding     CODE STATUS FULL CODE       Lines Placed:      Yes      No     Date Placed      Odom:               (   )    (X)  Central Line:     ( x  )    (X)  Arterial Line:    (  x )    ()  Intubation:       (   )    ()   NGT :               (   )    (X)                                   33 yo F with no past medical history [facility]  [facility] sharp 10 out of 10 abdominal pain beginning Tuesday with accompanying nausea.   Patient was taken to the OR on 3/11 and is POD #1 s/p laparotomy with left salpingectomy secondary to ectopic pregnancy. Patient now is c/o severe left abdominal pain with drop in hemoglobin.  ICU was consulted for possible intraabdominal bleeding.    Assessment:  #Intra-abdominal bleeding  # Ectopic pregnancy  # S/p left salpingectomy      Neuro   - A/O x3, no issues     Cardiovascular   - No Acute issues  - p/w ectopic pregnancy s/p laparotomy with left salpingectomy   - There was concern for hemorraghic shock 2/2 intraabdominal bleeding  - hb was 5.7 on admission  - 2 units PRBC were given  - Patient started complaining of difficulty breathing, itching, and scratchy thought.  - She was treated with Epinephrine, diphenhydramine, and dexamethasone  - s/p OR 3/12, no active bleeding noted  - Now H/H Stable  - Continue to trend H/H  - Blood bank notified of potential transfusion reaction      Pulmonary    - no issues     Gastrointestinal  - Advance diet as tolerated per Surgical team     Renal  -no issues     Endocrine  - no issues     Infectious Disease  - no issues   - monitor for signs of infection post op   - Antibiotics per surgical team     Heme  #Anemia  - S/P OR 3/12 for concern for intraabdominal bleeding, no bleeding noted  - S/P 2PRBC  - p/w ectopic pregnancy s/p laparotomy with left salpingectomy   - There was concern for hemorraghic shock 2/2 intraabdominal bleeding  - hb was 5.7 on admission  - 2 units PRBC were given  - Patient started complaining of difficulty breathing, itching, and scratchy thought.  - She was treated with Epinephrine, diphenhydramine, and dexamethasone  - s/p OR 3/12, no active bleeding noted  - Now H/H Stable  - Continue to trend H/H  - Blood bank notified of potential transfusion reaction    Skin   - no issues       DVT Prophylaxis: SCD, advance to Chemical DVT PPX per Surgical team    CODE STATUS FULL CODE       Lines Placed:      Yes      No     Date Placed      Odom:               (   )    (X)  Central Line:     ( x  )    (X)  Arterial Line:    (  x )    ()  Intubation:       (   )    ()   NGT :               (   )    (X)

## 2023-03-13 NOTE — CHART NOTE - NSCHARTNOTEFT_GEN_A_CORE
Removed Patient CVC and Right A-line.  Held pressure for both for 15 mininues (CVC First, than once complete removed Right radial A-Line).  Held pressure until hemostatis was achieved.  Patient tolerated this well.   used to explained removal procedure and answer questions during removal (ID: [id number], Name: Eliezer).  Patient examined 10 and 30 minutes post removal, no bleeding, patient appears well.

## 2023-03-13 NOTE — PROGRESS NOTE ADULT - SUBJECTIVE AND OBJECTIVE BOX
32y  Patient seen at [facility] with complaint of pain- awaiting pain management.  arnold in place, np flatus; npo  Denies HA, CP, SOB, N/V/D,  no bm; dizziness, palpitations, worsening abdominal pain, worsening vaginal bleeding, or any other concerns.     Vital Signs Last 24 Hrs  T(C): 36.2 (13 Mar 2023 08:00), Max: 37.3 (12 Mar 2023 20:00)  T(F): 97.2 (13 Mar 2023 08:00), Max: 99.1 (12 Mar 2023 20:00)  HR: 70 (13 Mar 2023 08:00) (56 - 105)  BP: 106/54 (12 Mar 2023 16:00) (97/55 - 118/68)  BP(mean): 65 (12 Mar 2023 16:00) (65 - 86)  RR: 21 (13 Mar 2023 08:00) (9 - 24)  SpO2: 98% (13 Mar 2023 08:00) (93% - 100%)    Parameters below as of 13 Mar 2023 08:00  Patient On (Oxygen Delivery Method): room air        Gen: A&O x 3, NAD Pt taking short breaths due to pain.   Chest: CTA B/L  Cardiac: S1,S2  RRR  Abdomen: +BS; soft; tender, soft distension.  dressing in place c/d/i  Gyn: min vaginal bleeding   Extremities: Nontender, no worsening edema                          10.9   16.90 )-----------( 237      ( 13 Mar 2023 04:36 )             33.9     03-13    139  |  109<H>  |  7   ----------------------------<  110<H>  4.1   |  22  |  0.47<L>    Ca    7.7<L>      13 Mar 2023 04:36  Phos  3.7     03-13  Mg     2.2     03-13    TPro  4.7<L>  /  Alb  1.9<L>  /  TBili  0.3  /  DBili  x   /  AST  18  /  ALT  14  /  AlkPhos  58  03-12      A/P: POD # 2/1 HD#4 s/p re-opening for left ectopic pregnancy pod#2 of minilap left salpingectomy and pod#1 of reopening  for suspected hemoperitoneum.     s/p 2 units PRBC    Anaphylactic reaction to  dilaudid vs blood transfusion.   plan:  -control pain - iv toradol/ tylenol/ ? fentanyl  -advance diet to regular   -oob, encourage ambulation  - hold lovenox - SCD in place for DVT ppx  - benadryl if needed.   - Consider step down in care- per ICU  -d/w Blayne

## 2023-03-14 ENCOUNTER — TRANSCRIPTION ENCOUNTER (OUTPATIENT)
Age: 33
End: 2023-03-14

## 2023-03-14 VITALS
DIASTOLIC BLOOD PRESSURE: 76 MMHG | TEMPERATURE: 98 F | OXYGEN SATURATION: 99 % | SYSTOLIC BLOOD PRESSURE: 133 MMHG | HEART RATE: 58 BPM | RESPIRATION RATE: 16 BRPM

## 2023-03-14 LAB
BASOPHILS # BLD AUTO: 0.04 K/UL — SIGNIFICANT CHANGE UP (ref 0–0.2)
BASOPHILS NFR BLD AUTO: 0.4 % — SIGNIFICANT CHANGE UP (ref 0–2)
EOSINOPHIL # BLD AUTO: 0.23 K/UL — SIGNIFICANT CHANGE UP (ref 0–0.5)
EOSINOPHIL NFR BLD AUTO: 2.1 % — SIGNIFICANT CHANGE UP (ref 0–6)
HCT VFR BLD CALC: 31.9 % — LOW (ref 34.5–45)
HGB BLD-MCNC: 10.2 G/DL — LOW (ref 11.5–15.5)
IMM GRANULOCYTES NFR BLD AUTO: 0.3 % — SIGNIFICANT CHANGE UP (ref 0–0.9)
LYMPHOCYTES # BLD AUTO: 2.89 K/UL — SIGNIFICANT CHANGE UP (ref 1–3.3)
LYMPHOCYTES # BLD AUTO: 26.7 % — SIGNIFICANT CHANGE UP (ref 13–44)
MCHC RBC-ENTMCNC: 25.2 PG — LOW (ref 27–34)
MCHC RBC-ENTMCNC: 32 GM/DL — SIGNIFICANT CHANGE UP (ref 32–36)
MCV RBC AUTO: 79 FL — LOW (ref 80–100)
MONOCYTES # BLD AUTO: 0.74 K/UL — SIGNIFICANT CHANGE UP (ref 0–0.9)
MONOCYTES NFR BLD AUTO: 6.8 % — SIGNIFICANT CHANGE UP (ref 2–14)
NEUTROPHILS # BLD AUTO: 6.9 K/UL — SIGNIFICANT CHANGE UP (ref 1.8–7.4)
NEUTROPHILS NFR BLD AUTO: 63.7 % — SIGNIFICANT CHANGE UP (ref 43–77)
NRBC # BLD: 0 /100 WBCS — SIGNIFICANT CHANGE UP (ref 0–0)
PLATELET # BLD AUTO: 240 K/UL — SIGNIFICANT CHANGE UP (ref 150–400)
RBC # BLD: 4.04 M/UL — SIGNIFICANT CHANGE UP (ref 3.8–5.2)
RBC # FLD: 18.5 % — HIGH (ref 10.3–14.5)
WBC # BLD: 10.83 K/UL — HIGH (ref 3.8–10.5)
WBC # FLD AUTO: 10.83 K/UL — HIGH (ref 3.8–10.5)

## 2023-03-14 PROCEDURE — 85730 THROMBOPLASTIN TIME PARTIAL: CPT

## 2023-03-14 PROCEDURE — 86901 BLOOD TYPING SEROLOGIC RH(D): CPT

## 2023-03-14 PROCEDURE — 83605 ASSAY OF LACTIC ACID: CPT

## 2023-03-14 PROCEDURE — 83010 ASSAY OF HAPTOGLOBIN QUANT: CPT

## 2023-03-14 PROCEDURE — 84132 ASSAY OF SERUM POTASSIUM: CPT

## 2023-03-14 PROCEDURE — 85610 PROTHROMBIN TIME: CPT

## 2023-03-14 PROCEDURE — 80048 BASIC METABOLIC PNL TOTAL CA: CPT

## 2023-03-14 PROCEDURE — 76856 US EXAM PELVIC COMPLETE: CPT

## 2023-03-14 PROCEDURE — 96375 TX/PRO/DX INJ NEW DRUG ADDON: CPT

## 2023-03-14 PROCEDURE — 83690 ASSAY OF LIPASE: CPT

## 2023-03-14 PROCEDURE — 83615 LACTATE (LD) (LDH) ENZYME: CPT

## 2023-03-14 PROCEDURE — 86850 RBC ANTIBODY SCREEN: CPT

## 2023-03-14 PROCEDURE — 86900 BLOOD TYPING SEROLOGIC ABO: CPT

## 2023-03-14 PROCEDURE — 87635 SARS-COV-2 COVID-19 AMP PRB: CPT

## 2023-03-14 PROCEDURE — 84702 CHORIONIC GONADOTROPIN TEST: CPT

## 2023-03-14 PROCEDURE — 81001 URINALYSIS AUTO W/SCOPE: CPT

## 2023-03-14 PROCEDURE — 85025 COMPLETE CBC W/AUTO DIFF WBC: CPT

## 2023-03-14 PROCEDURE — 84295 ASSAY OF SERUM SODIUM: CPT

## 2023-03-14 PROCEDURE — 99285 EMERGENCY DEPT VISIT HI MDM: CPT

## 2023-03-14 PROCEDURE — P9040: CPT

## 2023-03-14 PROCEDURE — 99232 SBSQ HOSP IP/OBS MODERATE 35: CPT

## 2023-03-14 PROCEDURE — 88305 TISSUE EXAM BY PATHOLOGIST: CPT

## 2023-03-14 PROCEDURE — 80053 COMPREHEN METABOLIC PANEL: CPT

## 2023-03-14 PROCEDURE — 83735 ASSAY OF MAGNESIUM: CPT

## 2023-03-14 PROCEDURE — 86923 COMPATIBILITY TEST ELECTRIC: CPT

## 2023-03-14 PROCEDURE — 85379 FIBRIN DEGRADATION QUANT: CPT

## 2023-03-14 PROCEDURE — 86880 COOMBS TEST DIRECT: CPT

## 2023-03-14 PROCEDURE — 86078 PHYS BLOOD BANK SERV REACTJ: CPT

## 2023-03-14 PROCEDURE — 71045 X-RAY EXAM CHEST 1 VIEW: CPT

## 2023-03-14 PROCEDURE — 76830 TRANSVAGINAL US NON-OB: CPT

## 2023-03-14 PROCEDURE — 85027 COMPLETE CBC AUTOMATED: CPT

## 2023-03-14 PROCEDURE — 96374 THER/PROPH/DIAG INJ IV PUSH: CPT

## 2023-03-14 PROCEDURE — 88300 SURGICAL PATH GROSS: CPT

## 2023-03-14 PROCEDURE — 84100 ASSAY OF PHOSPHORUS: CPT

## 2023-03-14 PROCEDURE — 36430 TRANSFUSION BLD/BLD COMPNT: CPT

## 2023-03-14 PROCEDURE — 82803 BLOOD GASES ANY COMBINATION: CPT

## 2023-03-14 PROCEDURE — 36415 COLL VENOUS BLD VENIPUNCTURE: CPT

## 2023-03-14 PROCEDURE — 82330 ASSAY OF CALCIUM: CPT

## 2023-03-14 RX ORDER — SENNOSIDES/DOCUSATE SODIUM 8.6MG-50MG
2 TABLET ORAL
Qty: 10 | Refills: 0
Start: 2023-03-14 | End: 2023-03-18

## 2023-03-14 RX ORDER — SENNOSIDES/DOCUSATE SODIUM 8.6MG-50MG
2 TABLET ORAL
Qty: 60 | Refills: 0
Start: 2023-03-14 | End: 2023-04-12

## 2023-03-14 RX ORDER — ONDANSETRON 8 MG/1
1 TABLET, FILM COATED ORAL
Qty: 15 | Refills: 0
Start: 2023-03-14 | End: 2023-03-18

## 2023-03-14 RX ORDER — SIMETHICONE 80 MG/1
1 TABLET, CHEWABLE ORAL
Qty: 10 | Refills: 0
Start: 2023-03-14 | End: 2023-03-18

## 2023-03-14 RX ORDER — IBUPROFEN 200 MG
1 TABLET ORAL
Qty: 120 | Refills: 0
Start: 2023-03-14 | End: 2023-04-12

## 2023-03-14 RX ORDER — ACETAMINOPHEN 500 MG
2 TABLET ORAL
Qty: 42 | Refills: 0
Start: 2023-03-14 | End: 2023-03-20

## 2023-03-14 RX ORDER — ASCORBIC ACID 60 MG
1 TABLET,CHEWABLE ORAL
Qty: 30 | Refills: 0
Start: 2023-03-14 | End: 2023-04-12

## 2023-03-14 RX ORDER — SIMETHICONE 80 MG/1
80 TABLET, CHEWABLE ORAL EVERY 4 HOURS
Refills: 0 | Status: DISCONTINUED | OUTPATIENT
Start: 2023-03-14 | End: 2023-03-14

## 2023-03-14 RX ORDER — FERROUS SULFATE 325(65) MG
1 TABLET ORAL
Qty: 30 | Refills: 0
Start: 2023-03-14 | End: 2023-04-12

## 2023-03-14 RX ORDER — IBUPROFEN 200 MG
1 TABLET ORAL
Qty: 40 | Refills: 0
Start: 2023-03-14 | End: 2023-03-23

## 2023-03-14 RX ORDER — ACETAMINOPHEN 500 MG
1000 TABLET ORAL EVERY 8 HOURS
Refills: 0 | Status: DISCONTINUED | OUTPATIENT
Start: 2023-03-14 | End: 2023-03-14

## 2023-03-14 RX ADMIN — Medication 1000 MILLIGRAM(S): at 11:28

## 2023-03-14 RX ADMIN — Medication 600 MILLIGRAM(S): at 12:21

## 2023-03-14 RX ADMIN — Medication 600 MILLIGRAM(S): at 18:06

## 2023-03-14 RX ADMIN — ONDANSETRON 4 MILLIGRAM(S): 8 TABLET, FILM COATED ORAL at 10:15

## 2023-03-14 RX ADMIN — Medication 1000 MILLIGRAM(S): at 03:13

## 2023-03-14 RX ADMIN — Medication 1000 MILLIGRAM(S): at 15:37

## 2023-03-14 RX ADMIN — SIMETHICONE 80 MILLIGRAM(S): 80 TABLET, CHEWABLE ORAL at 16:46

## 2023-03-14 RX ADMIN — OXYCODONE HYDROCHLORIDE 5 MILLIGRAM(S): 5 TABLET ORAL at 06:48

## 2023-03-14 RX ADMIN — Medication 400 MILLIGRAM(S): at 02:43

## 2023-03-14 RX ADMIN — Medication 400 MILLIGRAM(S): at 10:43

## 2023-03-14 RX ADMIN — Medication 600 MILLIGRAM(S): at 13:11

## 2023-03-14 RX ADMIN — CHLORHEXIDINE GLUCONATE 1 APPLICATION(S): 213 SOLUTION TOPICAL at 12:21

## 2023-03-14 RX ADMIN — Medication 1000 MILLIGRAM(S): at 14:45

## 2023-03-14 RX ADMIN — POLYETHYLENE GLYCOL 3350 17 GRAM(S): 17 POWDER, FOR SOLUTION ORAL at 12:21

## 2023-03-14 RX ADMIN — OXYCODONE HYDROCHLORIDE 5 MILLIGRAM(S): 5 TABLET ORAL at 00:20

## 2023-03-14 NOTE — DISCHARGE NOTE PROVIDER - NSDCMRMEDTOKEN_GEN_ALL_CORE_FT
acetaminophen 650 mg oral tablet, extended release: 2 tab(s) orally every 8 hours, As Needed -for mild pain   Colace 2-in-1 50 mg-8.6 mg oral tablet: 2 tab(s) orally once a day   ibuprofen 600 mg oral tablet: 1 tab(s) orally every 6 hours, As Needed -for moderate pain    acetaminophen 650 mg oral tablet, extended release: 2 tab(s) orally every 8 hours, As Needed -for mild pain   Colace 2-in-1 50 mg-8.6 mg oral tablet: 2 tab(s) orally once a day   ibuprofen 600 mg oral tablet: 1 tab(s) orally every 6 hours, As Needed -for moderate pain   ondansetron 8 mg oral tablet: 1 tab(s) orally every 8 hours    Gas-X Ultra Softgels 180 mg oral capsule: 1 cap(s) orally 2 times a day    Colace 2-in-1 50 mg-8.6 mg oral tablet: 2 tab(s) orally once a day (at bedtime)   ferrous sulfate 325 mg (65 mg elemental iron) oral tablet: 1 tab(s) orally once a day   Gas-X Ultra Softgels 180 mg oral capsule: 1 cap(s) orally 2 times a day   ibuprofen 600 mg oral tablet: 1 tab(s) orally every 6 hours   Vitamin C 500 mg oral tablet: 1 tab(s) orally once a day

## 2023-03-14 NOTE — DISCHARGE NOTE PROVIDER - NSDCCPCAREPLAN_GEN_ALL_CORE_FT
PRINCIPAL DISCHARGE DIAGNOSIS  Diagnosis: Pregnancy, ectopic  Assessment and Plan of Treatment:       SECONDARY DISCHARGE DIAGNOSES  Diagnosis: Anemia due to acute blood loss  Assessment and Plan of Treatment:

## 2023-03-14 NOTE — DISCHARGE NOTE PROVIDER - CARE PROVIDER_API CALL
Sky Cisneros)  Obstetrics and Gynecology  [address] [place] [address], [place] [address]  Phone: [phone]  Fax: [phone]  Scheduled Appointment: 03/23/2023

## 2023-03-14 NOTE — PROGRESS NOTE ADULT - REASON FOR ADMISSION
left ectopic pregnancy
left lower abdominal pain

## 2023-03-14 NOTE — DISCHARGE NOTE NURSING/CASE MANAGEMENT/SOCIAL WORK - NSDCPEFALRISK_GEN_ALL_CORE
For information on Fall & Injury Prevention, visit: [email or web address] OR  [email or web address] OR  https://www.cdc.gov/steadi/patient.html

## 2023-03-14 NOTE — PROGRESS NOTE ADULT - SUBJECTIVE AND OBJECTIVE BOX
Patient seen at bedside resting comfortably and offers no new complaints. + Ambulation, voiding without difficulty, + flatus; no bm; tolerating clear diet. Pt denies CP, SOB, N/V/D, dizziness, palpitations, vaginal bleeding or any other complaints.    Vital Signs Last 24 Hrs  T(C): 36.9 (14 Mar 2023 09:31), Max: 37.1 (13 Mar 2023 22:08)  T(F): 98.4 (14 Mar 2023 09:31), Max: 98.8 (13 Mar 2023 22:08)  HR: 58 (14 Mar 2023 09:31) (58 - 88)  BP: 133/76 (14 Mar 2023 09:31) (105/62 - 133/76)  BP(mean): 74 (13 Mar 2023 13:00) (74 - 74)  RR: 16 (14 Mar 2023 09:31) (11 - 24)  SpO2: 99% (14 Mar 2023 09:31) (97% - 100%)     Parameters below as of 14 Mar 2023 09:31  Patient On (Oxygen Delivery Method): room air    MEDICATIONS  (STANDING):  acetaminophen     Tablet .. 1000 milliGRAM(s) Oral every 8 hours  acetaminophen   IVPB .. 1000 milliGRAM(s) IV Intermittent once  chlorhexidine 2% Cloths 1 Application(s) Topical daily  polyethylene glycol 3350 17 Gram(s) Oral daily  senna 2 Tablet(s) Oral at bedtime    MEDICATIONS  (PRN):  ondansetron Injectable 4 milliGRAM(s) IV Push every 4 hours PRN Nausea and/or Vomiting  oxyCODONE    IR 5 milliGRAM(s) Oral every 4 hours PRN Severe Pain (7 - 10)  sodium chloride 0.9% lock flush 10 milliLiter(s) IV Push every 1 hour PRN Pre/post blood products, medications, blood draw, and to maintain line patency      PE  Gen: A&O x 3, NAD  Chest: CTA B/L  Cardiac: S1,S2  RRR  Abdomen: +BS; soft; Nontender, nondistended, dressing removed incision C/D/I w steri strips in place  Gyn: no vaginal bleeding   Extremities: Nontender, no worsening edema                          10.2   10.83 )-----------( 240      ( 14 Mar 2023 09:08 )             31.9     03-13    139  |  109<H>  |  7   ----------------------------<  110<H>  4.1   |  22  |  0.47<L>    Ca    7.7<L>      13 Mar 2023 04:36  Phos  3.7     03-13  Mg     2.2     03-13    TPro  4.7<L>  /  Alb  1.9<L>  /  TBili  0.3  /  DBili  x   /  AST  18  /  ALT  14  /  AlkPhos  58  03-12

## 2023-03-14 NOTE — PROGRESS NOTE ADULT - ASSESSMENT
Confidential Drug Utilization Report  Search Terms: David loyd, 1990Search Date: 03/13/2023 09:20:16 AM  The Drug Utilization Report below displays all of the controlled substance prescriptions, if any, that your patient has filled in the last twelve months. The information displayed on this report is compiled from pharmacy submissions to the Department, and accurately reflects the information as submitted by the pharmacies.    This report was requested by: Elizabeth Fernando | Reference #: [id number]    There are no results for the search terms that you entered.

## 2023-03-14 NOTE — DISCHARGE NOTE PROVIDER - HOSPITAL COURSE
29 y/o female admitted for ectopic pregnancy s/p laparotomy with left salpingectomy, patient re-operated for suspected hemoperitoneum however there was no evidence of ongoing bleeding; pt was admitted to ICU and received two units of PRBC at this time she developed anaphylactic reaction (blood transfusion reaction vs dilaudid). Pt was stepped down from ICU to GYN service after hemoglobin trend was stable; unremarkable post op course once patient was stepped down, pt hemodynamically stable

## 2023-03-14 NOTE — PROGRESS NOTE ADULT - PROBLEM SELECTOR PLAN 1
-Pain management as needed  -DVT ppx: OOB and ambulate upon arnold removal  -f/u Rpt CBC   -DC arnold f/u trial of void  -Advance diet with flatus  -Encourage breastfeeding
Pt with acute lower abdominal pain which is somatic in nature due s/p mini laparotomy, left salpingectomy, and IUD removal for left ectopic pregnancy on 3/11, POD #3.   Opioid pain recommendations   - Continue oxycodone 5 mg PO q 4 hours PRN severe pain. Monitor for sedation/ respiratory depression.   Non-opioid pain recommendations   - NSAIDs per surgical team.   - Acetaminophen 1 gram IV q 6 hours for 24 hours only completed, now changed to 1G PO q8h x 3 days. Monitor LFTs  Bowel Regimen  - Continue Miralax 17G PO daily  - Continue Senna 2 tablets at bedtime for constipation  Mild pain   - Non-pharmacological pain treatment recommendations  - Warm/ Cool packs PRN   - Repositioning,, imagery, relaxation, distraction.  - Physical therapy OOB if no contraindications   Recommendations discussed with primary team and RN.
A/P: POD #1 s/p laparotomy left salpingectomy secondary to ectopic pregnancy with symptomatic acute blood loss anemia, distended abd r/o pelvic bleeding  -Plan for CT abd and pelvis with IV contrast  -Consent obtained for prbc blood transfusion x 2 units  -Toradol for pain  -Pain management as needed  -cont post op care  -Keep NPO  -CBC and BMP  -Pt was seen with Dr. Ward

## 2023-03-14 NOTE — DISCHARGE NOTE PROVIDER - NSDCFUADDINST_GEN_ALL_CORE_FT
no sex, nothing in vagina, no heavy lifting, no pushing, eat high fiber food, ambulation daily as tolerated, shower daily, clean wound well and keep dry after;   For pain you can alternate Tylenol and Motrin as prescribed.   DO NOT TAKE MORE THAN 4,000mg OF TYLENOL DAILY  Call Dr. Cisneros office to make an appointment for next Thursday 3/23/23

## 2023-03-14 NOTE — PROGRESS NOTE ADULT - ASSESSMENT
A/P: POD #3/2 s/p laparotomy, left salpingectomy for ectopic pregnancy, re-op for suspected hemoperitoneum on 3/12; pt was admitted to ICU due to anaphylactic reaction blood transfusion vs. dilaudid; s/p 2 units prbc H/H stable, stepped down from ICU 3/13, pt hemodynamically stable   -cont pain management  -advance diet to regular  -oob, encourage ambulation  -discharge home   -follow up with Dr. Cisneros in 1 week  -d/w Dr. Cisneros

## 2023-03-14 NOTE — PROGRESS NOTE ADULT - SUBJECTIVE AND OBJECTIVE BOX
Source of information: MITZI FATIMA, Chart review  Patient language: Malaysian/ Understands English  : n/a pt refused  today    HPI:  31 yo F presenting [facility] ED from [facility] for recurring sharp 10 out of 10 abdominal pain beginning Tuesday with accompanying nausea beginning today. Patient's LMP 2023. Hcg positive, US findings concerning for left ectopic adnexal pregnancy.Patient has IUD in situ. Patient denies vaginal bleeding, denies vomiting, diarrhea, SOB or chest pain. Patient denies fevers or chills. Reports one sexual partner.    ob/gynhx: , primary c/s in 2016, for LGA, boy. Denies history of STDs or abnl pap smears. last seen gynecologist  2 years prior for IUD placement. Normal menses.  shx: c/s , uncomplicated  medhx: denies       (10 Mar 2023 22:17)    Pt s/p mini laparotomy, left salpingectomy, and IUD removal for left ectopic pregnancy on 3/11, POD #3. Pain consulted 3/13 for pain recommendations. Per team, pt experienced wheezing, throat itching swelling, difficulty breathing and anxiety after second PRBC transfusion on 3/12. Given concern for possible allergic/anaphylaxis to transfusion, pt was given 0.3 IM Epinephrine, Decadron 5mg IVP, Pepcid 20mg, and Benadryl 50 mg IVP. 1 hour prior to PRBC patient had received Dilaudid for the first time, less likely reaction to opioid given timing.  Pt seen and examined at bedside. Denies abdominal pain today. Pain effectively managed on current pain regimen. States she does not use pain medications at home. Tolerating clear liquid diet. Denies lethargy, chest pain, SOB, nausea, vomiting, constipation. Reports passing flatus, last BM 3/10. Patient stated goal for pain control: to be able to take deep breaths, get out of bed to chair and ambulate with tolerable pain control.     PAST MEDICAL & SURGICAL HISTORY:  No pertinent past medical history      S/P           FAMILY HISTORY:      Social History:  denies toxic habits x3; denies h/o anx/depression (10 Mar 2023 22:17)   [X ] Denies ETOH use, illicit drug use and smoking    Allergies    No Known Allergies    MEDICATIONS  (STANDING):  acetaminophen     Tablet .. 1000 milliGRAM(s) Oral every 8 hours  chlorhexidine 2% Cloths 1 Application(s) Topical daily  ibuprofen  Tablet. 600 milliGRAM(s) Oral every 6 hours  polyethylene glycol 3350 17 Gram(s) Oral daily  senna 2 Tablet(s) Oral at bedtime    MEDICATIONS  (PRN):  ondansetron Injectable 4 milliGRAM(s) IV Push every 4 hours PRN Nausea and/or Vomiting  oxyCODONE    IR 5 milliGRAM(s) Oral every 4 hours PRN Severe Pain (7 - 10)  sodium chloride 0.9% lock flush 10 milliLiter(s) IV Push every 1 hour PRN Pre/post blood products, medications, blood draw, and to maintain line patency      Vital Signs Last 24 Hrs  T(C): 36.9 (14 Mar 2023 09:31), Max: 37.1 (13 Mar 2023 22:08)  T(F): 98.4 (14 Mar 2023 09:31), Max: 98.8 (13 Mar 2023 22:08)  HR: 58 (14 Mar 2023 09:) (58 - 88)  BP: 133/76 (14 Mar 2023 09:31) (105/62 - 133/76)  BP(mean): 74 (13 Mar 2023 13:00) (74 - 74)  RR: 16 (14 Mar 2023 09:31) (16 - 24)  SpO2: 99% (14 Mar 2023 09:31) (98% - 100%)    Parameters below as of 14 Mar 2023 09:31  Patient On (Oxygen Delivery Method): room air      COVID-19 PCR: NotDetec (10 Mar 2023 21:45)    LABS: Reviewed                          10.2   10.83 )-----------( 240      ( 14 Mar 2023 09:08 )             31.9     03-13    139  |  109<H>  |  7   ----------------------------<  110<H>  4.1   |  22  |  0.47<L>    Ca    7.7<L>      13 Mar 2023 04:36  Phos  3.7     03-13  Mg     2.2     03-13    TPro  4.7<L>  /  Alb  1.9<L>  /  TBili  0.3  /  DBili  x   /  AST  18  /  ALT  14  /  AlkPhos  58  03-12    PT/INR - ( 12 Mar 2023 19:34 )   PT: 11.9 sec;   INR: 1.00 ratio         PTT - ( 12 Mar 2023 19:34 )  PTT:23.0 sec  LIVER FUNCTIONS - ( 12 Mar 2023 15:52 )  Alb: 1.9 g/dL / Pro: 4.7 g/dL / ALK PHOS: 58 U/L / ALT: 14 U/L DA / AST: 18 U/L / GGT: x           COVID-19 PCR: NotDetec (10 Mar 2023 21:45)    Radiology: Reviewed.   < from: US Pelvis Complete (US Pelvis Complete .) (03.10.23 @ 20:54) >    ACC: [id number] EXAM:  US TRANSVAGINAL   ORDERED BY: ROJAS CHAVEZ     ACC: [id number] EXAM:  US PELVIC COMPLETE   ORDERED BY: ROJAS CHAVEZ     PROCEDURE DATE:  03/10/2023          INTERPRETATION:  CLINICAL INDICATION: Lower abdominal pain, nausea. 6   weeks 1 day by LMP 2023. Beta-hCG 765.    TECHNIQUE: Transabdominal and transvaginal ultrasound of the pelvis was   performed. Grayscale, color Doppler and spectral Doppler were utilized.    COMPARISON: CT and US 10/12/2016..    FINDINGS:    Uterus: 8.4 x 4.8 x 6.2 cm. Unremarkable.  Endometrium: 1.0 cm. IUD in place. No intrauterine gestation identified.  Right ovary: 2.5 x 0.9 x 2.0 cm. Small follicles. Flow present.  Left ovary: 2.4 x 1.9 x 2.5 cm. A 1.3 x 1.6 cm corpus luteum. Flow   present. A 2.9 x 1.9 x 2.5 cm hypoechoic structure with somewhat tubular   configuration and peripheral vascularity at the left adnexa.  Additional: Trace free fluid with minimal debris..    IMPRESSION:    IUD in place. Findings concerning for left ectopic adnexal pregnancy.   Trace free fluid with minimal debris. Recommend clinical correlation to   assess early rupture.    Discussed with Dr. Chavez.    --- End of Report ---            CHELSEA SHORT MD; Attending Radiologist  This document has been electronically signed. Mar 10 2023  9:06PM    < end of copied text >    < from: US Transvaginal (03.10.23 @ 20:54) >  ACC: [id number] EXAM:  US TRANSVAGINAL   ORDERED BY: ROJAS CHAVEZ     ACC: [id number] EXAM:  US PELVIC COMPLETE   ORDERED BY: ROJAS CHAVEZ     PROCEDURE DATE:  03/10/2023          INTERPRETATION:  CLINICAL INDICATION: Lower abdominal pain, nausea. 6   weeks 1 day by LMP 2023. Beta-hCG 765.    TECHNIQUE: Transabdominal and transvaginal ultrasound of the pelvis was   performed. Grayscale, color Doppler and spectral Doppler were utilized.    COMPARISON: CT and US 10/12/2016..    FINDINGS:    Uterus: 8.4 x 4.8 x 6.2 cm. Unremarkable.  Endometrium: 1.0 cm. IUD in place. No intrauterine gestation identified.  Right ovary: 2.5 x 0.9 x 2.0 cm. Small follicles. Flow present.  Left ovary: 2.4 x 1.9 x 2.5 cm. A 1.3 x 1.6 cm corpus luteum. Flow   present. A 2.9 x 1.9 x 2.5 cm hypoechoic structure with somewhat tubular   configuration and peripheral vascularity at the left adnexa.  Additional: Trace free fluid with minimal debris..    IMPRESSION:    IUD in place. Findings concerning for left ectopic adnexal pregnancy.   Trace free fluid with minimal debris. Recommend clinical correlation to   assess early rupture.    Discussed with Dr. Chavez.    --- End of Report ---            CHELSEA SHORT MD; Attending Radiologist  This document has been electronically signed. Mar 10 2023  9:06PM    < end of copied text >    < from: Xray Chest 1 View-PORTABLE IMMEDIATE (Xray Chest 1 View-PORTABLE IMMEDIATE .) (23 @ 15:29) >    ACC: [id number] EXAM:  XR CHEST PORTABLE IMMED 1V   ORDERED BY: MICHELLE RUELAS     PROCEDURE DATE:  2023          INTERPRETATION:  INDICATION: Hemorrhagic shock. Line placement    Portable chest 2:50 PM    COMPARISON: None    FINDINGS:  Heart/Vascular: The heart size, mediastinum, hilum and aorta are within   normal limits for projection.  Pulmonary: Midline trachea. There is no focal infiltrate, congestion or   effusion.    Bones: There is no fracture.  Lines and catheter: Tip of right IJ introducer sheath in SVC. There is no   pneumothorax.    Impression:    No acute pulmonary disease.    Tip of right IJ introducer sheath in SVC. There is no pneumothorax.    --- End of Report ---             EMELY MARIE DO; Attending Radiologist  This document has been electronically signed. Mar 12 2023  3:36PM    < end of copied text >      ORT Score -   Family Hx of substance abuse	Female	      Male  Alcohol 	                                           1                     3  Illegal drugs	                                   2                     3  Rx drugs                                           4 	                  4  Personal Hx of substance abuse		  Alcohol 	                                          3	                  3  Illegal drugs                                     4	                  4  Rx drugs                                            5 	                  5  Age between 16- 45 years	           1                     1  hx preadolescent sexual abuse	   3 	                  0  Psychological disease		  ADD, OCD, bipolar, schizophrenia   2	          2  Depression                                           1 	          1  Total: 1    a score of 3 or lower indicates low risk for opioid abuse		  a score of 4-7 indicates moderate risk for opioid abuse		  a score of 8 or higher indicates high risk for opioid abuse  	  REVIEW OF SYSTEMS:  CONSTITUTIONAL: No fever + fatigue  HEENT:  No difficulty hearing, no change in vision  NECK: No pain or stiffness  RESPIRATORY: No cough, wheezing, chills or hemoptysis; No shortness of breath  CARDIOVASCULAR: No chest pain, palpitations, dizziness, or leg swelling  GASTROINTESTINAL: No loss of appetite, decreased PO intake. Denies abdominal pain today. No nausea, vomiting; No diarrhea or constipation.   GENITOURINARY: No dysuria, frequency, hematuria, retention or incontinence  MUSCULOSKELETAL: No joint pain or swelling; No muscle, back, or extremity pain, no upper or lower motor strength weakness, no saddle anesthesia, bowel/bladder incontinence, no falls   NEURO: No headaches, No numbness/tingling b/l LE, No weakness    PHYSICAL EXAM:  GENERAL: Alert & Oriented X4, calm, cooperative, NAD, Good concentration. Speech is clear.   RESPIRATORY: Respirations even and unlabored. Clear to auscultation bilaterally; No rales, rhonchi, wheezing, or rubs  CARDIOVASCULAR: Normal S1/S2, regular rate and rhythm; No murmurs, rubs, or gallops. No JVD.   GASTROINTESTINAL:  Soft, + lower abdominal tenderness, Nondistended; Bowel sounds present + lower abdominal Steri-strips c/d/i    PERIPHERAL VASCULAR:  Extremities warm without edema. 2+ Peripheral Pulses, No cyanosis, No calf tenderness  MUSCULOSKELETAL: Motor Strength 5/5 B/L upper and 3/5 lower extremities; + decreased ROM b/l lower extremities; No tenderness on palpation of all joints.   SKIN: Warm, dry, intact. No rashes, lesions, scars or wounds.     Risk factors associated with adverse outcomes related to opioid treatment  [ ]  Concurrent benzodiazepine use  [ ]  History/ Active substance use or alcohol use disorder  [ ] Psychiatric co-morbidity  [ ] Sleep apnea  [ ] COPD  [ ] BMI> 35  [ ] Liver dysfunction  [ ] Renal dysfunction  [ ] CHF  [ ] Smoker  [ ]  Age > 60 years    [X ]  [place]  Reviewed and Copied to Chart. See below.    Plan of care and goal oriented pain management treatment options were discussed with patient and /or primary care giver; all questions and concerns were addressed and care was aligned with patient's wishes.    Educated patient on goal oriented pain management treatment options     23 @ 12:07

## 2023-03-14 NOTE — DISCHARGE NOTE NURSING/CASE MANAGEMENT/SOCIAL WORK - PATIENT PORTAL LINK FT
You can access the FollowMyHealth Patient Portal offered by [facility] by registering at the following website: [email or web address]. By joining [facility]’s FollowMyHealth portal, you will also be able to view your health information using other applications (apps) compatible with our system.

## 2023-03-14 NOTE — DISCHARGE NOTE PROVIDER - NSDCCPGOAL_GEN_ALL_CORE_FT
no sex nothing in vagina no heavy lifting no pushing eat high fiber food ambulation daily as tolerated shower daily clean wound well and keep dry after;   For pain you can alternate Tylenol and Motrin as prescribed.   DO NOT TAKE MORE THAN 4,000mg OF TYLENOL DAILY  Call Dr. Cisneros office to make an appointment for next thursday 3/23/23 no sex nothing in vagina no heavy lifting no pushing eat high fiber food ambulation daily as tolerated shower daily clean wound well and keep dry after;   For pain you can alternate Tylenol and Motrin as prescribed.   If you experience any Chest pain, shortness of breath, heavy vaginal bleeding, dizziness or any other concerns go to the Emergency Room for evaluation.  DO NOT TAKE MORE THAN 4,000mg OF TYLENOL DAILY  Call Dr. Cisneros office to make an appointment for next thursday 3/23/23

## 2023-03-16 LAB — SURGICAL PATHOLOGY STUDY: SIGNIFICANT CHANGE UP

## 2023-07-27 LAB
TRANSFUSION REACTION PATHOLOGIST COMMENTS: SIGNIFICANT CHANGE UP
TRANSFUSION REACTION PATHOLOGIST INTERPRETATION: SIGNIFICANT CHANGE UP

## 2023-09-13 NOTE — DISCHARGE NOTE NURSING/CASE MANAGEMENT/SOCIAL WORK - NSPROMEDSBROUGHTTOHOSP_GEN_A_NUR
Dear Jung Vogt,     It was our pleasure to care for you here at [facility], [address] [place] [address]. It is our hope that we were always able to exceed the expected standards for your care during your stay. You were hospitalized due to abdominal pain and pancreatitis. You were cared for on the medical/surgical floor by J Luis Box MD with the [facility] who covers for your primary care physician (PCP), Ashish Nieto MD, while you were hospitalized. If you have any questions or concerns related to this hospitalization, you may contact us at [phone]. For follow up as well as any medication refills, we recommend that you follow up with your primary care physician. A registered nurse will reach out to you by phone within a few days after your discharge to answer any additional questions that you may have after going home. However, at this time we provide for you here, the most important instructions / recommendations at discharge:     Notable Medication Adjustments -   Okay to resume all preadmission medications at the preadmission doses  Testing Required after Discharge -   To be further determined in the outpatient setting by your primary care provider  Important follow up information -   Please follow-up with the providers as outlined in this discharge packet  Other Instructions -   Please refrain from any further alcohol use  Please maintain a healthy diabetic diet  Please review this entire after visit summary as additional general instructions including medication list, appointments, activity, diet, any pertinent wound care, and other additional recommendations from your care team that may be provided for you.       Sincerely,     J Luis Box MD no

## 2023-11-28 NOTE — CONSULT NOTE ADULT - CONSULT REQUESTED DATE/TIME
12-Mar-2023 13:27
13-Mar-2023 09:20
Pt presented with severe range BPs and headache, meeting criteria for preeclampsia with severe features on postpartum day 4. Pt received magnesium for seizure prophylaxsis for 24 hours. Head CT was negative. On hD#2 BPs were well controlled on Labetalol 200 BID and patient was discharged in stable condition.
